# Patient Record
Sex: FEMALE | Race: WHITE | Employment: FULL TIME
[De-identification: names, ages, dates, MRNs, and addresses within clinical notes are randomized per-mention and may not be internally consistent; named-entity substitution may affect disease eponyms.]

---

## 2017-02-09 ENCOUNTER — TELEPHONE (OUTPATIENT)
Dept: OBGYN | Facility: CLINIC | Age: 47
End: 2017-02-09

## 2017-02-14 DIAGNOSIS — N63.10 BREAST MASS, RIGHT: Primary | ICD-10-CM

## 2017-02-17 ENCOUNTER — TELEPHONE (OUTPATIENT)
Dept: OBGYN | Facility: CLINIC | Age: 47
End: 2017-02-17

## 2017-02-20 ENCOUNTER — TELEPHONE (OUTPATIENT)
Dept: OBGYN | Facility: CLINIC | Age: 47
End: 2017-02-20

## 2017-02-20 DIAGNOSIS — N63.10 BREAST MASS, RIGHT: Primary | ICD-10-CM

## 2018-04-23 ENCOUNTER — HOSPITAL ENCOUNTER (OUTPATIENT)
Age: 48
Discharge: HOME | End: 2018-04-25
Payer: COMMERCIAL

## 2018-04-23 ENCOUNTER — HOSPITAL ENCOUNTER (OUTPATIENT)
Dept: GENERAL RADIOLOGY | Age: 48
Discharge: HOME | End: 2018-04-25
Payer: COMMERCIAL

## 2018-04-23 DIAGNOSIS — S16.1XXA STRAIN OF NECK MUSCLE, INITIAL ENCOUNTER: ICD-10-CM

## 2018-04-23 DIAGNOSIS — M54.2 CERVICALGIA: ICD-10-CM

## 2018-04-23 PROCEDURE — 72040 X-RAY EXAM NECK SPINE 2-3 VW: CPT

## 2021-02-09 ENCOUNTER — TELEPHONE (OUTPATIENT)
Dept: PRIMARY CARE CLINIC | Age: 51
End: 2021-02-09

## 2021-02-09 RX ORDER — TIZANIDINE 4 MG/1
32 TABLET ORAL DAILY
Qty: 240 TABLET | Refills: 1 | Status: SHIPPED | OUTPATIENT
Start: 2021-02-09 | End: 2021-03-11

## 2021-03-30 ENCOUNTER — OFFICE VISIT (OUTPATIENT)
Dept: PRIMARY CARE CLINIC | Age: 51
End: 2021-03-30
Payer: COMMERCIAL

## 2021-03-30 VITALS
DIASTOLIC BLOOD PRESSURE: 72 MMHG | HEART RATE: 80 BPM | RESPIRATION RATE: 16 BRPM | SYSTOLIC BLOOD PRESSURE: 127 MMHG | TEMPERATURE: 98.1 F | BODY MASS INDEX: 26.46 KG/M2 | HEIGHT: 68 IN | WEIGHT: 174.6 LBS

## 2021-03-30 DIAGNOSIS — E11.9 TYPE 2 DIABETES MELLITUS WITHOUT COMPLICATION, WITHOUT LONG-TERM CURRENT USE OF INSULIN (HCC): Primary | ICD-10-CM

## 2021-03-30 DIAGNOSIS — F33.0 MILD EPISODE OF RECURRENT MAJOR DEPRESSIVE DISORDER (HCC): ICD-10-CM

## 2021-03-30 LAB — HBA1C MFR BLD: 7.7 %

## 2021-03-30 PROCEDURE — 3051F HG A1C>EQUAL 7.0%<8.0%: CPT | Performed by: FAMILY MEDICINE

## 2021-03-30 PROCEDURE — 83036 HEMOGLOBIN GLYCOSYLATED A1C: CPT | Performed by: FAMILY MEDICINE

## 2021-03-30 PROCEDURE — 99213 OFFICE O/P EST LOW 20 MIN: CPT | Performed by: FAMILY MEDICINE

## 2021-03-30 RX ORDER — BUPROPION HYDROCHLORIDE 100 MG/1
100 TABLET, EXTENDED RELEASE ORAL 2 TIMES DAILY
Qty: 60 TABLET | Refills: 3 | Status: SHIPPED | OUTPATIENT
Start: 2021-03-30

## 2021-03-30 RX ORDER — TIZANIDINE 4 MG/1
4 TABLET ORAL EVERY 6 HOURS PRN
COMMUNITY
End: 2021-08-02 | Stop reason: SDUPTHER

## 2021-03-30 SDOH — ECONOMIC STABILITY: FOOD INSECURITY: WITHIN THE PAST 12 MONTHS, YOU WORRIED THAT YOUR FOOD WOULD RUN OUT BEFORE YOU GOT MONEY TO BUY MORE.: NEVER TRUE

## 2021-03-30 SDOH — ECONOMIC STABILITY: TRANSPORTATION INSECURITY
IN THE PAST 12 MONTHS, HAS LACK OF TRANSPORTATION KEPT YOU FROM MEETINGS, WORK, OR FROM GETTING THINGS NEEDED FOR DAILY LIVING?: NO

## 2021-03-30 SDOH — ECONOMIC STABILITY: TRANSPORTATION INSECURITY
IN THE PAST 12 MONTHS, HAS THE LACK OF TRANSPORTATION KEPT YOU FROM MEDICAL APPOINTMENTS OR FROM GETTING MEDICATIONS?: NO

## 2021-03-30 ASSESSMENT — ENCOUNTER SYMPTOMS
ABDOMINAL DISTENTION: 1
RESPIRATORY NEGATIVE: 1
EYES NEGATIVE: 1

## 2021-03-30 ASSESSMENT — COLUMBIA-SUICIDE SEVERITY RATING SCALE - C-SSRS: 6. HAVE YOU EVER DONE ANYTHING, STARTED TO DO ANYTHING, OR PREPARED TO DO ANYTHING TO END YOUR LIFE?: NO

## 2021-03-30 ASSESSMENT — PATIENT HEALTH QUESTIONNAIRE - PHQ9
7. TROUBLE CONCENTRATING ON THINGS, SUCH AS READING THE NEWSPAPER OR WATCHING TELEVISION: 0
SUM OF ALL RESPONSES TO PHQ9 QUESTIONS 1 & 2: 6
SUM OF ALL RESPONSES TO PHQ QUESTIONS 1-9: 6
6. FEELING BAD ABOUT YOURSELF - OR THAT YOU ARE A FAILURE OR HAVE LET YOURSELF OR YOUR FAMILY DOWN: 1
10. IF YOU CHECKED OFF ANY PROBLEMS, HOW DIFFICULT HAVE THESE PROBLEMS MADE IT FOR YOU TO DO YOUR WORK, TAKE CARE OF THINGS AT HOME, OR GET ALONG WITH OTHER PEOPLE: 2
8. MOVING OR SPEAKING SO SLOWLY THAT OTHER PEOPLE COULD HAVE NOTICED. OR THE OPPOSITE, BEING SO FIGETY OR RESTLESS THAT YOU HAVE BEEN MOVING AROUND A LOT MORE THAN USUAL: 2
5. POOR APPETITE OR OVEREATING: 1
3. TROUBLE FALLING OR STAYING ASLEEP: 3
SUM OF ALL RESPONSES TO PHQ QUESTIONS 1-9: 6
2. FEELING DOWN, DEPRESSED OR HOPELESS: 3
SUM OF ALL RESPONSES TO PHQ QUESTIONS 1-9: 13
9. THOUGHTS THAT YOU WOULD BE BETTER OFF DEAD, OR OF HURTING YOURSELF: 0

## 2021-03-30 NOTE — PROGRESS NOTES
Subjective:      Patient ID: Donovan Gold is a 53 y.o. female. Patient presents to office to discuss recent lab results and current medications. Patient states she stopped taking Cymbalta because it was not effective and was causing her to gain weight. Patient states her OBGYN office will be faxing over recent lab results. Review of Systems   Constitutional: Positive for activity change, appetite change, fatigue and unexpected weight change. HENT: Negative. Eyes: Negative. Respiratory: Negative. Cardiovascular: Negative. Gastrointestinal: Positive for abdominal distention. Patient states she has been experiencing intermittent abdominal bloating. Endocrine: Negative. Genitourinary: Negative. Musculoskeletal: Negative. Skin: Negative. Allergic/Immunologic:        Patient states she has recent allergy testing with positive results. Neurological: Negative. Hematological: Negative. Psychiatric/Behavioral: The patient is nervous/anxious. Patient states she feels fatigued and is disinterested in doing anything. Objective:   Physical Exam  Constitutional:       Appearance: Normal appearance. She is obese. HENT:      Head: Normocephalic. Mouth/Throat:      Mouth: Mucous membranes are moist.      Pharynx: Oropharynx is clear. Eyes:      Extraocular Movements: Extraocular movements intact. Conjunctiva/sclera: Conjunctivae normal.      Pupils: Pupils are equal, round, and reactive to light. Neck:      Musculoskeletal: Normal range of motion and neck supple. Cardiovascular:      Rate and Rhythm: Normal rate. Pulses: Normal pulses. Heart sounds: Normal heart sounds. Pulmonary:      Effort: Pulmonary effort is normal.      Breath sounds: Normal breath sounds. Abdominal:      General: Bowel sounds are normal.   Musculoskeletal: Normal range of motion. Neurological:      General: No focal deficit present.       Mental Status: She is alert and oriented to person, place, and time. Psychiatric:         Behavior: Behavior normal.      Comments: Patient has a flat affect but is tearful at times throughout conversation. Assessment:      1. Type 2 diabetes mellitus without complication, without long-term current use of insulin ([facility] [address]            Plan:      Mary Castro was seen today for discuss labs and other. Diagnoses and all orders for this visit:    Type 2 diabetes mellitus without complication, without long-term current use of insulin ([facility])  -     POCT glycosylated hemoglobin (Hb A1C)    Other orders  -     buPROPion (WELLBUTRIN SR) 100 MG extended release tablet; Take 1 tablet by mouth 2 times daily  -     metFORMIN (GLUCOPHAGE) 500 MG tablet;  Take 1 tablet by mouth daily (with breakfast)                Electronically signed by Dara Huitron MD on 3/30/2021 at 12:25 PM

## 2021-04-01 ENCOUNTER — TELEPHONE (OUTPATIENT)
Dept: PRIMARY CARE CLINIC | Age: 51
End: 2021-04-01

## 2021-04-01 NOTE — TELEPHONE ENCOUNTER
Pt stated that the meds she was recently prescribed for her diabetes has a chemical (polyethylene glycol)  in it that she is allergic to. She is on the diet and doing the exercise. She wants to know if she she needs to still be seen in two weeks or in a month or so.  Please advise

## 2021-05-14 ENCOUNTER — OFFICE VISIT (OUTPATIENT)
Dept: PRIMARY CARE CLINIC | Age: 51
End: 2021-05-14
Payer: COMMERCIAL

## 2021-05-14 VITALS
SYSTOLIC BLOOD PRESSURE: 130 MMHG | OXYGEN SATURATION: 98 % | BODY MASS INDEX: 24.71 KG/M2 | DIASTOLIC BLOOD PRESSURE: 84 MMHG | WEIGHT: 163 LBS | HEIGHT: 68 IN | HEART RATE: 77 BPM

## 2021-05-14 DIAGNOSIS — R73.9 HYPERGLYCEMIA: Primary | ICD-10-CM

## 2021-05-14 LAB — HBA1C MFR BLD: 7.2 %

## 2021-05-14 PROCEDURE — 99213 OFFICE O/P EST LOW 20 MIN: CPT | Performed by: FAMILY MEDICINE

## 2021-05-14 PROCEDURE — 83036 HEMOGLOBIN GLYCOSYLATED A1C: CPT | Performed by: FAMILY MEDICINE

## 2021-05-14 NOTE — PROGRESS NOTES
Subjective:      Patient ID: Luis Zapien is a 53 y.o. female. Has lost 20 lbs  Feels a lot better with lower carb diet  Is more active  Could not take metformin due to propelene glycol allergy      Review of Systems   Constitutional: Negative. All other systems reviewed and are negative. Objective:   Physical Exam  Vitals signs reviewed. Constitutional:       Appearance: Normal appearance. Cardiovascular:      Rate and Rhythm: Normal rate. Musculoskeletal: Normal range of motion. Skin:     General: Skin is warm and dry. Neurological:      General: No focal deficit present. Psychiatric:         Thought Content: Thought content normal.         Judgment: Judgment normal.         Assessment:      1. Hyperglycemia            Plan:      Patrick Menjivar was seen today for follow-up.     Diagnoses and all orders for this visit:    Hyperglycemia      A1C better by 0.5  Will stay the course and recheck in 2 mo          Electronically signed by Rei Velarde MD on 5/14/2021 at 12:46 PM

## 2021-08-02 ENCOUNTER — OFFICE VISIT (OUTPATIENT)
Dept: PRIMARY CARE CLINIC | Age: 51
End: 2021-08-02
Payer: COMMERCIAL

## 2021-08-02 VITALS
SYSTOLIC BLOOD PRESSURE: 139 MMHG | HEIGHT: 67 IN | DIASTOLIC BLOOD PRESSURE: 87 MMHG | OXYGEN SATURATION: 99 % | BODY MASS INDEX: 23.86 KG/M2 | WEIGHT: 152 LBS | HEART RATE: 73 BPM

## 2021-08-02 DIAGNOSIS — E11.9 TYPE 2 DIABETES MELLITUS WITHOUT COMPLICATION, WITHOUT LONG-TERM CURRENT USE OF INSULIN (HCC): Primary | ICD-10-CM

## 2021-08-02 LAB — HBA1C MFR BLD: 6.9 %

## 2021-08-02 PROCEDURE — 99213 OFFICE O/P EST LOW 20 MIN: CPT | Performed by: FAMILY MEDICINE

## 2021-08-02 PROCEDURE — 83036 HEMOGLOBIN GLYCOSYLATED A1C: CPT | Performed by: FAMILY MEDICINE

## 2021-08-02 RX ORDER — TIZANIDINE 4 MG/1
4 TABLET ORAL EVERY 6 HOURS PRN
Qty: 60 TABLET | Refills: 3 | Status: SHIPPED | OUTPATIENT
Start: 2021-08-02 | End: 2022-01-18 | Stop reason: SDUPTHER

## 2021-08-02 RX ORDER — TIZANIDINE 4 MG/1
4 TABLET ORAL EVERY 6 HOURS PRN
Status: CANCELLED | OUTPATIENT
Start: 2021-08-02

## 2021-08-02 ASSESSMENT — ENCOUNTER SYMPTOMS
GASTROINTESTINAL NEGATIVE: 1
RESPIRATORY NEGATIVE: 1
RHINORRHEA: 1
EYE ITCHING: 1

## 2021-08-02 NOTE — PROGRESS NOTES
Subjective:      Patient ID: Juan F Seth is a 53 y.o. female. Patient presents for follow-up on Type 2 diabetes mellitus. Patient states she has not taken Metformin due to being allergic to the ingredients. Patient is managing diabetes with diet and activity. Review of Systems   Constitutional: Negative. HENT: Positive for congestion and rhinorrhea. Eyes: Positive for itching. Respiratory: Negative. Cardiovascular: Negative. Gastrointestinal: Negative. Endocrine: Negative. Genitourinary: Negative. Musculoskeletal: Positive for myalgias. Skin: Negative. Allergic/Immunologic: Positive for environmental allergies and food allergies. Neurological: Negative. Hematological: Negative. Psychiatric/Behavioral: Negative. Objective:   Physical Exam  Vitals reviewed. Constitutional:       Appearance: Normal appearance. She is normal weight. Comments: Recent planned weight loss. HENT:      Head: Normocephalic. Mouth/Throat:      Mouth: Mucous membranes are moist.      Pharynx: Oropharynx is clear. Cardiovascular:      Rate and Rhythm: Normal rate and regular rhythm. Pulses: Normal pulses. Heart sounds: Normal heart sounds. Pulmonary:      Effort: Pulmonary effort is normal.      Breath sounds: Normal breath sounds. Abdominal:      General: Bowel sounds are normal.   Musculoskeletal:         General: Normal range of motion. Skin:     General: Skin is warm and dry. Neurological:      General: No focal deficit present. Mental Status: She is alert and oriented to person, place, and time. Psychiatric:         Mood and Affect: Mood normal.         Behavior: Behavior normal.         Assessment:      1. Type 2 diabetes mellitus without complication, without long-term current use of insulin ([address]            Plan:      Veronica Newsome was seen today for diabetes and medication refill.     Diagnoses and all orders for this visit:    Type 2 diabetes mellitus without complication, without long-term current use of insulin ([address]    Other orders  -     tiZANidine (ZANAFLEX) 4 MG tablet; Take 1 tablet by mouth every 6 hours as needed (muscle pain) 8 mg at bed time, pt states she can take up to 32 mg per day if needed. Patient has made lifestyle modifications to control type 2 diabetes. Patient has lost lost 22 lbs. and her HA1C has gradually improved since 3/30/2021. Follow-up visit in 3 months.         Electronically signed by Tre Garcia MD on 8/2/2021 at 9:36 AM

## 2021-08-03 ENCOUNTER — TELEPHONE (OUTPATIENT)
Dept: PRIMARY CARE CLINIC | Age: 51
End: 2021-08-03

## 2021-08-03 NOTE — TELEPHONE ENCOUNTER
----- Message from Austin Johnson sent at 8/3/2021  9:24 AM EDT -----  Subject: Medication Problem    QUESTIONS  Name of Medication? tiZANidine (ZANAFLEX) 4 MG tablet  Patient-reported dosage and instructions? Take 1 tablet by mouth every 6   hours as needed (muscle pain) 8 mg at bed time, pt states she can take up   to 32 mg per day if needed. What question or problem do you have with the medication? Patient said was   only prescribed 60 tablets which only covers \"at night\" does not have   enough for the daytime dose. Would like to have a 90 days supply. Preferred Pharmacy? [address] [place] [address] phone number (if available)? [phone]  Additional Information for Provider? Please give the maximum amount, a 90   day supply is more affordable. Thank you  ---------------------------------------------------------------------------  --------------  CALL BACK INFO  What is the best way for the office to contact you? OK to leave message on   voicemail  Preferred Call Back Phone Number? [phone]  ---------------------------------------------------------------------------  --------------  SCRIPT ANSWERS  Relationship to Patient?  Self

## 2022-01-19 RX ORDER — TIZANIDINE 4 MG/1
4 TABLET ORAL EVERY 6 HOURS PRN
Qty: 60 TABLET | Refills: 3 | Status: SHIPPED | OUTPATIENT
Start: 2022-01-19

## 2024-02-19 ENCOUNTER — HOSPITAL ENCOUNTER (OUTPATIENT)
Age: 54
Setting detail: SPECIMEN
Discharge: HOME | End: 2024-02-19

## 2024-02-19 LAB
BASOPHILS # BLD: 0.04 K/UL (ref 0–0.2)
BASOPHILS NFR BLD: 1 % (ref 0–2)
CHOLEST SERPL-MCNC: 231 MG/DL
CHOLESTEROL/HDL RATIO: 2.5
EOSINOPHIL # BLD: 0.48 K/UL (ref 0–0.44)
EOSINOPHILS RELATIVE PERCENT: 7 % (ref 1–4)
ERYTHROCYTE [DISTWIDTH] IN BLOOD BY AUTOMATED COUNT: 12.4 % (ref 11.8–14.4)
HCT VFR BLD AUTO: 44 % (ref 36.3–47.1)
HDLC SERPL-MCNC: 92 MG/DL
HGB BLD-MCNC: 14.5 G/DL (ref 11.9–15.1)
IMM GRANULOCYTES # BLD AUTO: <0.03 K/UL (ref 0–0.3)
IMM GRANULOCYTES NFR BLD: 0 %
LDLC SERPL CALC-MCNC: 107 MG/DL (ref 0–130)
LYMPHOCYTES NFR BLD: 2.42 K/UL (ref 1.1–3.7)
LYMPHOCYTES RELATIVE PERCENT: 34 % (ref 24–43)
MCH RBC QN AUTO: 33 PG (ref 25.2–33.5)
MCHC RBC AUTO-ENTMCNC: 33 G/DL (ref 28.4–34.8)
MCV RBC AUTO: 100.2 FL (ref 82.6–102.9)
MONOCYTES NFR BLD: 0.58 K/UL (ref 0.1–1.2)
MONOCYTES NFR BLD: 8 % (ref 3–12)
NEUTROPHILS NFR BLD: 50 % (ref 36–65)
NEUTS SEG NFR BLD: 3.56 K/UL (ref 1.5–8.1)
NRBC BLD-RTO: 0 PER 100 WBC
PLATELET # BLD AUTO: 380 K/UL (ref 138–453)
PMV BLD AUTO: 9.5 FL (ref 8.1–13.5)
RBC # BLD AUTO: 4.39 M/UL (ref 3.95–5.11)
T4 FREE SERPL-MCNC: 1.3 NG/DL (ref 0.9–1.7)
TRIGL SERPL-MCNC: 160 MG/DL
TSH SERPL DL<=0.05 MIU/L-ACNC: 1.56 UIU/ML (ref 0.3–5)
WBC OTHER # BLD: 7.1 K/UL (ref 3.5–11.3)

## 2024-07-13 ENCOUNTER — APPOINTMENT (OUTPATIENT)
Dept: CT IMAGING | Age: 54
DRG: 439 | End: 2024-07-13
Payer: COMMERCIAL

## 2024-07-13 ENCOUNTER — HOSPITAL ENCOUNTER (INPATIENT)
Age: 54
LOS: 3 days | Discharge: PSYCHIATRIC OR SUBSTANCE USE FACILITY | DRG: 439 | End: 2024-07-16
Attending: EMERGENCY MEDICINE | Admitting: STUDENT IN AN ORGANIZED HEALTH CARE EDUCATION/TRAINING PROGRAM
Payer: COMMERCIAL

## 2024-07-13 ENCOUNTER — APPOINTMENT (OUTPATIENT)
Dept: MRI IMAGING | Age: 54
DRG: 439 | End: 2024-07-13
Payer: COMMERCIAL

## 2024-07-13 ENCOUNTER — APPOINTMENT (OUTPATIENT)
Dept: GENERAL RADIOLOGY | Age: 54
DRG: 439 | End: 2024-07-13
Payer: COMMERCIAL

## 2024-07-13 DIAGNOSIS — N17.9 ACUTE KIDNEY INJURY (HCC): ICD-10-CM

## 2024-07-13 DIAGNOSIS — K85.90 ACUTE PANCREATITIS, UNSPECIFIED COMPLICATION STATUS, UNSPECIFIED PANCREATITIS TYPE: Primary | ICD-10-CM

## 2024-07-13 PROBLEM — M79.7 CHRONIC FATIGUE SYNDROME WITH FIBROMYALGIA: Status: ACTIVE | Noted: 2024-07-13

## 2024-07-13 PROBLEM — I10 PRIMARY HYPERTENSION: Status: ACTIVE | Noted: 2024-07-13

## 2024-07-13 PROBLEM — E11.9 TYPE 2 DIABETES MELLITUS WITHOUT COMPLICATION, WITHOUT LONG-TERM CURRENT USE OF INSULIN (HCC): Status: ACTIVE | Noted: 2024-07-13

## 2024-07-13 PROBLEM — G93.32 CHRONIC FATIGUE SYNDROME WITH FIBROMYALGIA: Status: ACTIVE | Noted: 2024-07-13

## 2024-07-13 LAB
ALBUMIN SERPL-MCNC: 4.4 G/DL (ref 3.5–5.2)
ALBUMIN/GLOB SERPL: 1.6 {RATIO} (ref 1–2.5)
ALP SERPL-CCNC: 71 U/L (ref 35–104)
ALT SERPL-CCNC: 16 U/L (ref 5–33)
AMPHET UR QL SCN: NEGATIVE
ANION GAP SERPL CALCULATED.3IONS-SCNC: 17 MMOL/L (ref 9–17)
AST SERPL-CCNC: 16 U/L
BACTERIA URNS QL MICRO: ABNORMAL
BARBITURATES UR QL SCN: NEGATIVE
BASOPHILS # BLD: 0.1 K/UL (ref 0–0.2)
BASOPHILS NFR BLD: 1 % (ref 0–2)
BENZODIAZ UR QL: NEGATIVE
BILIRUB SERPL-MCNC: 0.5 MG/DL (ref 0.3–1.2)
BILIRUB UR QL STRIP: NEGATIVE
BUN SERPL-MCNC: 44 MG/DL (ref 6–20)
CALCIUM SERPL-MCNC: 9.9 MG/DL (ref 8.6–10.4)
CANNABINOIDS UR QL SCN: NEGATIVE
CASTS #/AREA URNS LPF: ABNORMAL /LPF
CASTS #/AREA URNS LPF: ABNORMAL /LPF
CHARACTER UR: ABNORMAL
CHLORIDE SERPL-SCNC: 91 MMOL/L (ref 98–107)
CK SERPL-CCNC: 47 U/L (ref 26–192)
CLARITY UR: CLEAR
CO2 SERPL-SCNC: 20 MMOL/L (ref 20–31)
COCAINE UR QL SCN: NEGATIVE
COLOR UR: YELLOW
CREAT SERPL-MCNC: 3.4 MG/DL (ref 0.5–0.9)
EOSINOPHIL # BLD: 0.2 K/UL (ref 0–0.4)
EOSINOPHILS RELATIVE PERCENT: 2 % (ref 1–4)
EPI CELLS #/AREA URNS HPF: ABNORMAL /HPF (ref 0–5)
ERYTHROCYTE [DISTWIDTH] IN BLOOD BY AUTOMATED COUNT: 12 % (ref 12.5–15.4)
ETHANOL PERCENT: <0.01 %
ETHANOLAMINE SERPL-MCNC: <10 MG/DL (ref 0–0.08)
FENTANYL UR QL: NEGATIVE
GFR, ESTIMATED: 16 ML/MIN/1.73M2
GLUCOSE BLD-MCNC: 127 MG/DL (ref 65–105)
GLUCOSE BLD-MCNC: 141 MG/DL (ref 65–105)
GLUCOSE SERPL-MCNC: 161 MG/DL (ref 70–99)
GLUCOSE UR STRIP-MCNC: ABNORMAL MG/DL
HCT VFR BLD AUTO: 37.1 % (ref 36–46)
HGB BLD-MCNC: 12.6 G/DL (ref 12–16)
HGB UR QL STRIP.AUTO: ABNORMAL
KETONES UR STRIP-MCNC: NEGATIVE MG/DL
LACTATE BLDV-SCNC: 1 MMOL/L (ref 0.5–2.2)
LACTATE BLDV-SCNC: 2.3 MMOL/L (ref 0.5–2.2)
LEUKOCYTE ESTERASE UR QL STRIP: NEGATIVE
LIPASE SERPL-CCNC: 147 U/L (ref 13–60)
LYMPHOCYTES NFR BLD: 2.3 K/UL (ref 1–4.8)
LYMPHOCYTES RELATIVE PERCENT: 24 % (ref 24–44)
MCH RBC QN AUTO: 32.8 PG (ref 26–34)
MCHC RBC AUTO-ENTMCNC: 34 G/DL (ref 31–37)
MCV RBC AUTO: 96.5 FL (ref 80–100)
METHADONE UR QL: NEGATIVE
MONOCYTES NFR BLD: 0.7 K/UL (ref 0.1–1.2)
MONOCYTES NFR BLD: 8 % (ref 2–11)
MYOGLOBIN SERPL-MCNC: 72 NG/ML (ref 25–58)
NEUTROPHILS NFR BLD: 65 % (ref 36–66)
NEUTS SEG NFR BLD: 6.3 K/UL (ref 1.8–7.7)
NITRITE UR QL STRIP: NEGATIVE
OPIATES UR QL SCN: NEGATIVE
OXYCODONE UR QL SCN: NEGATIVE
PCP UR QL SCN: NEGATIVE
PH UR STRIP: 6 [PH] (ref 5–8)
PLATELET # BLD AUTO: 328 K/UL (ref 140–450)
PMV BLD AUTO: 7.3 FL (ref 6–12)
POTASSIUM SERPL-SCNC: 3.9 MMOL/L (ref 3.7–5.3)
PROT SERPL-MCNC: 7.1 G/DL (ref 6.4–8.3)
PROT UR STRIP-MCNC: NEGATIVE MG/DL
RBC # BLD AUTO: 3.85 M/UL (ref 4–5.2)
RBC #/AREA URNS HPF: ABNORMAL /HPF (ref 0–2)
SODIUM SERPL-SCNC: 128 MMOL/L (ref 135–144)
SP GR UR STRIP: 1.01 (ref 1–1.03)
TEST INFORMATION: NORMAL
UROBILINOGEN UR STRIP-ACNC: NORMAL EU/DL (ref 0–1)
WBC #/AREA URNS HPF: ABNORMAL /HPF (ref 0–5)
WBC OTHER # BLD: 9.6 K/UL (ref 3.5–11)

## 2024-07-13 PROCEDURE — 2580000003 HC RX 258: Performed by: EMERGENCY MEDICINE

## 2024-07-13 PROCEDURE — 71045 X-RAY EXAM CHEST 1 VIEW: CPT

## 2024-07-13 PROCEDURE — 2580000003 HC RX 258: Performed by: STUDENT IN AN ORGANIZED HEALTH CARE EDUCATION/TRAINING PROGRAM

## 2024-07-13 PROCEDURE — 82947 ASSAY GLUCOSE BLOOD QUANT: CPT

## 2024-07-13 PROCEDURE — 6360000002 HC RX W HCPCS: Performed by: STUDENT IN AN ORGANIZED HEALTH CARE EDUCATION/TRAINING PROGRAM

## 2024-07-13 PROCEDURE — 83690 ASSAY OF LIPASE: CPT

## 2024-07-13 PROCEDURE — 71250 CT THORAX DX C-: CPT

## 2024-07-13 PROCEDURE — 1210000000 HC MED SURG R&B

## 2024-07-13 PROCEDURE — 6360000002 HC RX W HCPCS: Performed by: EMERGENCY MEDICINE

## 2024-07-13 PROCEDURE — 96361 HYDRATE IV INFUSION ADD-ON: CPT

## 2024-07-13 PROCEDURE — 83605 ASSAY OF LACTIC ACID: CPT

## 2024-07-13 PROCEDURE — 80307 DRUG TEST PRSMV CHEM ANLYZR: CPT

## 2024-07-13 PROCEDURE — 74181 MRI ABDOMEN W/O CONTRAST: CPT

## 2024-07-13 PROCEDURE — 93005 ELECTROCARDIOGRAM TRACING: CPT | Performed by: STUDENT IN AN ORGANIZED HEALTH CARE EDUCATION/TRAINING PROGRAM

## 2024-07-13 PROCEDURE — 83874 ASSAY OF MYOGLOBIN: CPT

## 2024-07-13 PROCEDURE — 85025 COMPLETE CBC W/AUTO DIFF WBC: CPT

## 2024-07-13 PROCEDURE — G0480 DRUG TEST DEF 1-7 CLASSES: HCPCS

## 2024-07-13 PROCEDURE — 6370000000 HC RX 637 (ALT 250 FOR IP): Performed by: NURSE PRACTITIONER

## 2024-07-13 PROCEDURE — 80053 COMPREHEN METABOLIC PANEL: CPT

## 2024-07-13 PROCEDURE — 96374 THER/PROPH/DIAG INJ IV PUSH: CPT

## 2024-07-13 PROCEDURE — 99222 1ST HOSP IP/OBS MODERATE 55: CPT | Performed by: STUDENT IN AN ORGANIZED HEALTH CARE EDUCATION/TRAINING PROGRAM

## 2024-07-13 PROCEDURE — 99285 EMERGENCY DEPT VISIT HI MDM: CPT

## 2024-07-13 PROCEDURE — 82550 ASSAY OF CK (CPK): CPT

## 2024-07-13 PROCEDURE — 36415 COLL VENOUS BLD VENIPUNCTURE: CPT

## 2024-07-13 PROCEDURE — 74176 CT ABD & PELVIS W/O CONTRAST: CPT

## 2024-07-13 PROCEDURE — 81001 URINALYSIS AUTO W/SCOPE: CPT

## 2024-07-13 RX ORDER — ACETAMINOPHEN 650 MG/1
650 SUPPOSITORY RECTAL EVERY 6 HOURS PRN
Status: DISCONTINUED | OUTPATIENT
Start: 2024-07-13 | End: 2024-07-16 | Stop reason: HOSPADM

## 2024-07-13 RX ORDER — ENOXAPARIN SODIUM 100 MG/ML
30 INJECTION SUBCUTANEOUS DAILY
Status: CANCELLED | OUTPATIENT
Start: 2024-07-13

## 2024-07-13 RX ORDER — HEPARIN SODIUM 5000 [USP'U]/ML
5000 INJECTION, SOLUTION INTRAVENOUS; SUBCUTANEOUS EVERY 8 HOURS SCHEDULED
Status: DISCONTINUED | OUTPATIENT
Start: 2024-07-13 | End: 2024-07-16 | Stop reason: HOSPADM

## 2024-07-13 RX ORDER — 0.9 % SODIUM CHLORIDE 0.9 %
1000 INTRAVENOUS SOLUTION INTRAVENOUS ONCE
Status: COMPLETED | OUTPATIENT
Start: 2024-07-13 | End: 2024-07-13

## 2024-07-13 RX ORDER — ONDANSETRON 2 MG/ML
4 INJECTION INTRAMUSCULAR; INTRAVENOUS ONCE
Status: COMPLETED | OUTPATIENT
Start: 2024-07-13 | End: 2024-07-13

## 2024-07-13 RX ORDER — GLUCAGON 1 MG/ML
1 KIT INJECTION PRN
Status: DISCONTINUED | OUTPATIENT
Start: 2024-07-13 | End: 2024-07-16 | Stop reason: HOSPADM

## 2024-07-13 RX ORDER — SODIUM CHLORIDE 9 MG/ML
INJECTION, SOLUTION INTRAVENOUS PRN
Status: DISCONTINUED | OUTPATIENT
Start: 2024-07-13 | End: 2024-07-16 | Stop reason: HOSPADM

## 2024-07-13 RX ORDER — SODIUM CHLORIDE 9 MG/ML
INJECTION, SOLUTION INTRAVENOUS CONTINUOUS
Status: ACTIVE | OUTPATIENT
Start: 2024-07-13 | End: 2024-07-15

## 2024-07-13 RX ORDER — ONDANSETRON 4 MG/1
4 TABLET, ORALLY DISINTEGRATING ORAL EVERY 8 HOURS PRN
Status: DISCONTINUED | OUTPATIENT
Start: 2024-07-13 | End: 2024-07-16 | Stop reason: HOSPADM

## 2024-07-13 RX ORDER — SODIUM CHLORIDE 0.9 % (FLUSH) 0.9 %
5-40 SYRINGE (ML) INJECTION EVERY 12 HOURS SCHEDULED
Status: DISCONTINUED | OUTPATIENT
Start: 2024-07-13 | End: 2024-07-16 | Stop reason: HOSPADM

## 2024-07-13 RX ORDER — SODIUM CHLORIDE 0.9 % (FLUSH) 0.9 %
3 SYRINGE (ML) INJECTION EVERY 8 HOURS
Status: DISCONTINUED | OUTPATIENT
Start: 2024-07-13 | End: 2024-07-16 | Stop reason: HOSPADM

## 2024-07-13 RX ORDER — INSULIN LISPRO 100 [IU]/ML
0-4 INJECTION, SOLUTION INTRAVENOUS; SUBCUTANEOUS
Status: DISCONTINUED | OUTPATIENT
Start: 2024-07-13 | End: 2024-07-16 | Stop reason: HOSPADM

## 2024-07-13 RX ORDER — ACETAMINOPHEN 325 MG/1
650 TABLET ORAL EVERY 6 HOURS PRN
Status: DISCONTINUED | OUTPATIENT
Start: 2024-07-13 | End: 2024-07-16 | Stop reason: HOSPADM

## 2024-07-13 RX ORDER — SODIUM CHLORIDE 0.9 % (FLUSH) 0.9 %
5-40 SYRINGE (ML) INJECTION PRN
Status: DISCONTINUED | OUTPATIENT
Start: 2024-07-13 | End: 2024-07-16 | Stop reason: HOSPADM

## 2024-07-13 RX ORDER — ARIPIPRAZOLE 5 MG/1
10 TABLET ORAL DAILY
Status: DISCONTINUED | OUTPATIENT
Start: 2024-07-14 | End: 2024-07-16 | Stop reason: HOSPADM

## 2024-07-13 RX ORDER — DULOXETIN HYDROCHLORIDE 30 MG/1
30 CAPSULE, DELAYED RELEASE ORAL DAILY
Status: DISCONTINUED | OUTPATIENT
Start: 2024-07-14 | End: 2024-07-16 | Stop reason: HOSPADM

## 2024-07-13 RX ORDER — ONDANSETRON 2 MG/ML
4 INJECTION INTRAMUSCULAR; INTRAVENOUS EVERY 6 HOURS PRN
Status: DISCONTINUED | OUTPATIENT
Start: 2024-07-13 | End: 2024-07-16 | Stop reason: HOSPADM

## 2024-07-13 RX ORDER — DEXTROSE MONOHYDRATE 100 MG/ML
INJECTION, SOLUTION INTRAVENOUS CONTINUOUS PRN
Status: DISCONTINUED | OUTPATIENT
Start: 2024-07-13 | End: 2024-07-16 | Stop reason: HOSPADM

## 2024-07-13 RX ORDER — LISINOPRIL 2.5 MG/1
20 TABLET ORAL DAILY
Status: ON HOLD | COMMUNITY
End: 2024-07-17 | Stop reason: HOSPADM

## 2024-07-13 RX ORDER — INSULIN LISPRO 100 [IU]/ML
0-4 INJECTION, SOLUTION INTRAVENOUS; SUBCUTANEOUS NIGHTLY
Status: DISCONTINUED | OUTPATIENT
Start: 2024-07-13 | End: 2024-07-16 | Stop reason: HOSPADM

## 2024-07-13 RX ORDER — BISACODYL 5 MG/1
5 TABLET, DELAYED RELEASE ORAL DAILY PRN
Status: DISCONTINUED | OUTPATIENT
Start: 2024-07-13 | End: 2024-07-16 | Stop reason: HOSPADM

## 2024-07-13 RX ORDER — TIZANIDINE 4 MG/1
4 TABLET ORAL EVERY 6 HOURS PRN
Status: DISCONTINUED | OUTPATIENT
Start: 2024-07-13 | End: 2024-07-14

## 2024-07-13 RX ORDER — HYDROCHLOROTHIAZIDE 12.5 MG/1
25 TABLET ORAL DAILY
Status: ON HOLD | COMMUNITY
End: 2024-07-17 | Stop reason: HOSPADM

## 2024-07-13 RX ADMIN — HEPARIN SODIUM 5000 UNITS: 5000 INJECTION INTRAVENOUS; SUBCUTANEOUS at 17:24

## 2024-07-13 RX ADMIN — SODIUM CHLORIDE, PRESERVATIVE FREE 3 ML: 5 INJECTION INTRAVENOUS at 08:43

## 2024-07-13 RX ADMIN — TIZANIDINE 4 MG: 4 TABLET ORAL at 22:11

## 2024-07-13 RX ADMIN — SODIUM CHLORIDE, PRESERVATIVE FREE 10 ML: 5 INJECTION INTRAVENOUS at 22:13

## 2024-07-13 RX ADMIN — SODIUM CHLORIDE 1000 ML: 9 INJECTION, SOLUTION INTRAVENOUS at 08:38

## 2024-07-13 RX ADMIN — ONDANSETRON 4 MG: 2 INJECTION INTRAMUSCULAR; INTRAVENOUS at 08:40

## 2024-07-13 RX ADMIN — SODIUM CHLORIDE 1000 ML: 9 INJECTION, SOLUTION INTRAVENOUS at 11:03

## 2024-07-13 RX ADMIN — HEPARIN SODIUM 5000 UNITS: 5000 INJECTION INTRAVENOUS; SUBCUTANEOUS at 22:11

## 2024-07-13 RX ADMIN — SODIUM CHLORIDE: 9 INJECTION, SOLUTION INTRAVENOUS at 17:24

## 2024-07-13 ASSESSMENT — PAIN DESCRIPTION - ONSET: ONSET: ON-GOING

## 2024-07-13 ASSESSMENT — PAIN DESCRIPTION - DESCRIPTORS: DESCRIPTORS: OTHER (COMMENT)

## 2024-07-13 ASSESSMENT — PAIN DESCRIPTION - PAIN TYPE: TYPE: CHRONIC PAIN

## 2024-07-13 ASSESSMENT — PAIN SCALES - GENERAL: PAINLEVEL_OUTOF10: 10

## 2024-07-13 ASSESSMENT — LIFESTYLE VARIABLES: HOW OFTEN DO YOU HAVE A DRINK CONTAINING ALCOHOL: 2-4 TIMES A MONTH

## 2024-07-13 ASSESSMENT — PAIN DESCRIPTION - LOCATION: LOCATION: CHEST

## 2024-07-13 ASSESSMENT — PAIN DESCRIPTION - FREQUENCY: FREQUENCY: INTERMITTENT

## 2024-07-13 NOTE — ED NOTES
Ambulatory to restroom w/ side assist; states sl dizzy/weak but gait slow/steady. Voiding clear yellow urine for specimen; asking for brief to put on/given.

## 2024-07-13 NOTE — ED NOTES
Ambulatory to restroom; states feeling \"even more dizzy\" but awake/alert  gait slow but steady w/ side assist. Void and return to bed w/ call light w/in reach. IV fluids continue via pump

## 2024-07-13 NOTE — H&P
[facility]  Office: [phone]  Magdaleno Ace DO, Arnoldo Smith DO, Junito Hardin DO, Oliiver Dsouza DO, Rodrigo Phillips MD, Andreia Bettencourt MD, Niya Solorio MD, Lucia Loyd MD,  Taurus Landa MD, Josef Leonard MD, Nani Guillen MD,  Anthony Bull DO, Roger Cuellar MD, Dagoberto Aguilar MD, Alphonso Ace DO, Radha Webb MD,  Abel Arnold DO, Mireya Kilpatrick MD, Merari Valencia MD, Key Payan MD, Sharmin Roland MD,  Gil Alvarado MD, Delaney Bennett MD, Kendra Roberts MD, Paz Breaux MD, Audie Santos MD, Toni Edmondson MD, Eddie Mcqueen DO, Aaron Nunez DO, Irwin Boo MD,  George Fournier MD, Shirley Waterhouse, CNP,  Emi Young, CNP, Ronnie Martinez, CNP,  Christina Link, DNP, Tammie Spivey, CNP, Deanne Connolly, CNP, Swetha Gagnon, CNP, Alice Moreno, CNP, Jaquelin Sutton, PA-C, Rosalinda Escobar PA-C, Jolene Garber, CNP, Rose Perez, CNP, Scott Guzman, CNP, Cami Murillo, CNP, Meagan Marquis, CNP, Susan Ledezma, CNS, Princess Wagner, CNP, Alona Roach, CNP, Tracy Schwab, CNP         [facility]   IN-PATIENT SERVICE   [facility]    HISTORY AND PHYSICAL EXAMINATION            Date:   7/13/2024  Patient name:  Brooke Blake  Date of admission:  7/13/2024  8:21 AM  MRN:   [id number]  Account:  [id number]  YOB: 1970  PCP:    Albert Julio MD  Room:   1TRAUMA/1TRAUMA  Code Status:    No Order      History Obtained From:     patient    History of Present Illness:     Brooke Blake is a 53 y.o. female with a past medical history of chronic fatigue syndrome, fibromyalgia, DM2 and HTN who presented to the emergency department on 7/13/2024 complaining of nausea/vomiting and failure to thrive. The patient states that her symptoms began over a month ago and have progressively worsened. She states that she has very little to eat over the past month and this is secondary to abdominal pain as well as loss of  <0.010 <0.010 %   CK    Collection Time: 07/13/24  8:20 AM   Result Value Ref Range    Total CK 47 26 - 192 U/L   Myoglobin, Blood    Collection Time: 07/13/24  8:20 AM   Result Value Ref Range    Myoglobin 72 (H) 25 - 58 ng/mL   Urinalysis with Reflex to Culture    Collection Time: 07/13/24  8:59 AM    Specimen: Urine   Result Value Ref Range    Color, UA Yellow Yellow    Turbidity UA Clear Clear    Glucose, Ur 2+ (A) NEGATIVE mg/dL    Bilirubin, Urine NEGATIVE NEGATIVE    Ketones, Urine NEGATIVE NEGATIVE mg/dL    Specific Gravity, UA 1.010 1.005 - 1.030    Urine Hgb TRACE (A) NEGATIVE    pH, Urine 6.0 5.0 - 8.0    Protein, UA NEGATIVE NEGATIVE mg/dL    Urobilinogen, Urine Normal 0.0 - 1.0 EU/dL    Nitrite, Urine NEGATIVE NEGATIVE    Leukocyte Esterase, Urine NEGATIVE NEGATIVE   Microscopic Urinalysis    Collection Time: 07/13/24  8:59 AM   Result Value Ref Range    WBC, UA 2 TO 5 0 - 5 /HPF    RBC, UA 0 TO 2 0 - 2 /HPF    Casts UA 0 TO 2 /LPF    Casts UA HYALINE /LPF    Epithelial Cells, UA 0 TO 2 0 - 5 /HPF    Bacteria, UA FEW (A) None    Other Observations UA (A) NOT REQ.     Utilizing a urinalysis as the only screening method to exclude a potential uropathogen can be unreliable in many patient populations.  Rapid screening tests are less sensitive than culture and if UTI is a clinical possibility, culture should be considered despite a negative urinalysis.     DRUG SCREEN MULTI URINE    Collection Time: 07/13/24  8:59 AM   Result Value Ref Range    Amphetamine Screen, Ur NEGATIVE NEGATIVE    Barbiturate Screen, Ur NEGATIVE NEGATIVE    Benzodiazepine Screen, Urine NEGATIVE NEGATIVE    Cocaine Metabolite, Urine NEGATIVE NEGATIVE    Methadone Screen, Urine NEGATIVE NEGATIVE    Opiates, Urine NEGATIVE NEGATIVE    Phencyclidine, Urine NEGATIVE NEGATIVE    Cannabinoid Scrn, Ur NEGATIVE NEGATIVE    Oxycodone Screen, Ur NEGATIVE NEGATIVE    Fentanyl, Ur NEGATIVE NEGATIVE    Test Information       [facility] provides medical

## 2024-07-13 NOTE — PROGRESS NOTES
During admission assessment, pt was tearful, anxious, and has great fear of financial struggle due to recent stressful events. Pt has had increased weight loss, fatigue an inability to drive. Pt has not left her home in some time due to stressors. Pt appears to be in emotional distress and not coping well. Social Service consult made along with Spiritual services. Dr. Aguilar also updated

## 2024-07-13 NOTE — PLAN OF CARE
Problem: Discharge Planning  Goal: Discharge to home or other facility with appropriate resources  Outcome: Progressing  Flowsheets (Taken 7/13/2024 1501)  Discharge to home or other facility with appropriate resources: Identify barriers to discharge with patient and caregiver     Problem: Pain  Goal: Verbalizes/displays adequate comfort level or baseline comfort level  Outcome: Progressing     Problem: Safety - Adult  Goal: Free from fall injury  Outcome: Progressing     Problem: Self Harm/Suicidality  Goal: Will have no self-injury during hospital stay  Description: INTERVENTIONS:  1.  Ensure constant observer at bedside with Q15M safety checks  2.  Maintain a safe environment  3.  Secure patient belongings  4.  Ensure family/visitors adhere to safety recommendations  5.  Ensure safety tray has been added to patient's diet order  6.  Every shift and PRN: Re-assess suicidal risk via Frequent Screener    Outcome: Progressing

## 2024-07-13 NOTE — ED PROVIDER NOTES
[facility] Emergency Department  [address]  [place] [facility] [address]  Phone: [phone]  Fax: [phone]  EMERGENCY DEPARTMENT ENCOUNTER      Pt Name: Brooke Blake  MRN: [id number]  Birthdate 1970  Date of evaluation: 7/13/2024    CHIEF COMPLAINT       Chief Complaint   Patient presents with    Fatigue     Fatigue/weakness since end of June; been \"home bound\" per pt since then; states supposed to have blood work by PCP but unable to get it done until home health anshu blood yesterday. Today too fatigued to walk when taking out dog. Loss of weight/unable to eat. Hx fibromyalgia and chronic fatigue.       HISTORY OF PRESENT ILLNESS    Brooke Blake is a 53 y.o. female who presents with a complaint of fatigue.  Patient has a history of fibromyalgia and chronic fatigue syndrome.  She is homebound for the last 2 months.  She says she has lost 20 pounds.  However she states that she does order food from [employer] and she does not cook.  This morning she felt dizzy and faint so she called EMS.  She states every morning she seems to vomit while like emesis but this has been ongoing for the last 2 months.  She denies any abdominal pain, diarrhea, hematemesis, melena, hematochezia.  She denies any fever, chills, or cough.  She denies any URI symptoms.  She denies any chest pain, shortness of breath.  She denies any headache.  She denies any paresthesias or focal weakness.  She denies any flank pain, hematuria, dysuria.  She denies any fall or trauma.    REVIEW OF SYSTEMS     Review of Systems   All other systems reviewed and are negative.    PAST MEDICAL HISTORY    has a past medical history of Anxiety, Chronic fatigue, Fibromyalgia, Heavy periods, History of ovarian cyst, and HTN (hypertension).    SURGICAL HISTORY      has a past surgical history that includes Breast biopsy (Right, 02/14/2017).    CURRENT MEDICATIONS       Previous Medications    BUPROPION (WELLBUTRIN SR)

## 2024-07-13 NOTE — ED NOTES
Resting quietly- has eye mask covering eyes; taking sips of water per admit order; previously NPO.

## 2024-07-14 PROBLEM — F32.2 MAJOR DEPRESSIVE DISORDER, SINGLE EPISODE, SEVERE WITHOUT PSYCHOSIS (HCC): Status: ACTIVE | Noted: 2024-07-14

## 2024-07-14 LAB
ALBUMIN SERPL-MCNC: 3.8 G/DL (ref 3.5–5.2)
ALBUMIN/GLOB SERPL: 1.6 {RATIO} (ref 1–2.5)
ALP SERPL-CCNC: 60 U/L (ref 35–104)
ALT SERPL-CCNC: 14 U/L (ref 5–33)
ANION GAP SERPL CALCULATED.3IONS-SCNC: 11 MMOL/L (ref 9–17)
AST SERPL-CCNC: 15 U/L
BASOPHILS # BLD: 0.1 K/UL (ref 0–0.2)
BASOPHILS NFR BLD: 1 % (ref 0–2)
BILIRUB SERPL-MCNC: 0.4 MG/DL (ref 0.3–1.2)
BUN SERPL-MCNC: 26 MG/DL (ref 6–20)
CALCIUM SERPL-MCNC: 9.2 MG/DL (ref 8.6–10.4)
CHLORIDE SERPL-SCNC: 108 MMOL/L (ref 98–107)
CO2 SERPL-SCNC: 21 MMOL/L (ref 20–31)
CREAT SERPL-MCNC: 2 MG/DL (ref 0.5–0.9)
EKG ATRIAL RATE: 86 BPM
EKG P AXIS: 70 DEGREES
EKG P-R INTERVAL: 170 MS
EKG Q-T INTERVAL: 426 MS
EKG QRS DURATION: 78 MS
EKG QTC CALCULATION (BAZETT): 509 MS
EKG R AXIS: 82 DEGREES
EKG T AXIS: 80 DEGREES
EKG VENTRICULAR RATE: 86 BPM
EOSINOPHIL # BLD: 0.2 K/UL (ref 0–0.4)
EOSINOPHILS RELATIVE PERCENT: 3 % (ref 1–4)
ERYTHROCYTE [DISTWIDTH] IN BLOOD BY AUTOMATED COUNT: 12.5 % (ref 12.5–15.4)
GFR, ESTIMATED: 30 ML/MIN/1.73M2
GGT SERPL-CCNC: 29 U/L (ref 5–36)
GLUCOSE BLD-MCNC: 123 MG/DL (ref 65–105)
GLUCOSE BLD-MCNC: 140 MG/DL (ref 65–105)
GLUCOSE BLD-MCNC: 144 MG/DL (ref 65–105)
GLUCOSE BLD-MCNC: 191 MG/DL (ref 65–105)
GLUCOSE SERPL-MCNC: 120 MG/DL (ref 70–99)
HCT VFR BLD AUTO: 34 % (ref 36–46)
HGB BLD-MCNC: 11.7 G/DL (ref 12–16)
LIPASE SERPL-CCNC: 98 U/L (ref 13–60)
LYMPHOCYTES NFR BLD: 2.3 K/UL (ref 1–4.8)
LYMPHOCYTES RELATIVE PERCENT: 38 % (ref 24–44)
MCH RBC QN AUTO: 33.6 PG (ref 26–34)
MCHC RBC AUTO-ENTMCNC: 34.3 G/DL (ref 31–37)
MCV RBC AUTO: 98.1 FL (ref 80–100)
MONOCYTES NFR BLD: 0.5 K/UL (ref 0.1–1.2)
MONOCYTES NFR BLD: 9 % (ref 2–11)
NEUTROPHILS NFR BLD: 49 % (ref 36–66)
NEUTS SEG NFR BLD: 3 K/UL (ref 1.8–7.7)
PLATELET # BLD AUTO: 291 K/UL (ref 140–450)
PMV BLD AUTO: 7 FL (ref 6–12)
POTASSIUM SERPL-SCNC: 4 MMOL/L (ref 3.7–5.3)
PROT SERPL-MCNC: 6.2 G/DL (ref 6.4–8.3)
RBC # BLD AUTO: 3.47 M/UL (ref 4–5.2)
SODIUM SERPL-SCNC: 140 MMOL/L (ref 135–144)
WBC OTHER # BLD: 6 K/UL (ref 3.5–11)

## 2024-07-14 PROCEDURE — 83690 ASSAY OF LIPASE: CPT

## 2024-07-14 PROCEDURE — 6360000002 HC RX W HCPCS: Performed by: STUDENT IN AN ORGANIZED HEALTH CARE EDUCATION/TRAINING PROGRAM

## 2024-07-14 PROCEDURE — 1210000000 HC MED SURG R&B

## 2024-07-14 PROCEDURE — 6370000000 HC RX 637 (ALT 250 FOR IP): Performed by: STUDENT IN AN ORGANIZED HEALTH CARE EDUCATION/TRAINING PROGRAM

## 2024-07-14 PROCEDURE — 36415 COLL VENOUS BLD VENIPUNCTURE: CPT

## 2024-07-14 PROCEDURE — 2580000003 HC RX 258: Performed by: STUDENT IN AN ORGANIZED HEALTH CARE EDUCATION/TRAINING PROGRAM

## 2024-07-14 PROCEDURE — 82977 ASSAY OF GGT: CPT

## 2024-07-14 PROCEDURE — 80053 COMPREHEN METABOLIC PANEL: CPT

## 2024-07-14 PROCEDURE — 90792 PSYCH DIAG EVAL W/MED SRVCS: CPT | Performed by: PSYCHIATRY & NEUROLOGY

## 2024-07-14 PROCEDURE — 99232 SBSQ HOSP IP/OBS MODERATE 35: CPT | Performed by: STUDENT IN AN ORGANIZED HEALTH CARE EDUCATION/TRAINING PROGRAM

## 2024-07-14 PROCEDURE — 82947 ASSAY GLUCOSE BLOOD QUANT: CPT

## 2024-07-14 PROCEDURE — 97116 GAIT TRAINING THERAPY: CPT

## 2024-07-14 PROCEDURE — 85025 COMPLETE CBC W/AUTO DIFF WBC: CPT

## 2024-07-14 PROCEDURE — 97162 PT EVAL MOD COMPLEX 30 MIN: CPT

## 2024-07-14 RX ORDER — TIZANIDINE 4 MG/1
8 TABLET ORAL NIGHTLY
Status: DISCONTINUED | OUTPATIENT
Start: 2024-07-14 | End: 2024-07-16 | Stop reason: HOSPADM

## 2024-07-14 RX ADMIN — TIZANIDINE 8 MG: 4 TABLET ORAL at 22:10

## 2024-07-14 RX ADMIN — DULOXETINE HYDROCHLORIDE 30 MG: 30 CAPSULE, DELAYED RELEASE ORAL at 09:02

## 2024-07-14 RX ADMIN — HEPARIN SODIUM 5000 UNITS: 5000 INJECTION INTRAVENOUS; SUBCUTANEOUS at 06:36

## 2024-07-14 RX ADMIN — HEPARIN SODIUM 5000 UNITS: 5000 INJECTION INTRAVENOUS; SUBCUTANEOUS at 22:11

## 2024-07-14 RX ADMIN — ARIPIPRAZOLE 10 MG: 5 TABLET ORAL at 09:02

## 2024-07-14 RX ADMIN — HEPARIN SODIUM 5000 UNITS: 5000 INJECTION INTRAVENOUS; SUBCUTANEOUS at 15:11

## 2024-07-14 RX ADMIN — SODIUM CHLORIDE, PRESERVATIVE FREE 10 ML: 5 INJECTION INTRAVENOUS at 22:16

## 2024-07-14 RX ADMIN — SODIUM CHLORIDE: 9 INJECTION, SOLUTION INTRAVENOUS at 03:22

## 2024-07-14 ASSESSMENT — PAIN SCALES - GENERAL: PAINLEVEL_OUTOF10: 6

## 2024-07-14 ASSESSMENT — PAIN - FUNCTIONAL ASSESSMENT: PAIN_FUNCTIONAL_ASSESSMENT: ACTIVITIES ARE NOT PREVENTED

## 2024-07-14 ASSESSMENT — PAIN DESCRIPTION - ORIENTATION: ORIENTATION: MID;UPPER

## 2024-07-14 ASSESSMENT — PAIN DESCRIPTION - PAIN TYPE: TYPE: CHRONIC PAIN

## 2024-07-14 ASSESSMENT — PAIN DESCRIPTION - LOCATION: LOCATION: CHEST

## 2024-07-14 ASSESSMENT — PAIN DESCRIPTION - DESCRIPTORS: DESCRIPTORS: OTHER (COMMENT)

## 2024-07-14 ASSESSMENT — PAIN DESCRIPTION - FREQUENCY: FREQUENCY: INTERMITTENT

## 2024-07-14 ASSESSMENT — PAIN DESCRIPTION - ONSET: ONSET: ON-GOING

## 2024-07-14 NOTE — PLAN OF CARE
Problem: Discharge Planning  Goal: Discharge to home or other facility with appropriate resources  Outcome: Progressing  Flowsheets  Taken 7/14/2024 1805 by Unique Walters RN  Discharge to home or other facility with appropriate resources:   Identify barriers to discharge with patient and caregiver   Arrange for needed discharge resources and transportation as appropriate   Refer to discharge planning if patient needs post-hospital services based on physician order or complex needs related to functional status, cognitive ability or social support system  Taken 7/14/2024 0800 by Luna Hernandez RN  Discharge to home or other facility with appropriate resources: Identify barriers to discharge with patient and caregiver  Note: Patient being seen by psychiatry to plan for discharge needs and possible placement     Problem: Pain  Goal: Verbalizes/displays adequate comfort level or baseline comfort level  Outcome: Progressing  Flowsheets (Taken 7/14/2024 1805)  Verbalizes/displays adequate comfort level or baseline comfort level:   Encourage patient to monitor pain and request assistance   Assess pain using appropriate pain scale  Note: Patient reports no pain at this time     Problem: Safety - Adult  Goal: Free from fall injury  Outcome: Progressing  Flowsheets (Taken 7/14/2024 1805)  Free From Fall Injury: Instruct family/caregiver on patient safety  Note: Patient remains free from injury, uses call light, sitter at bedside continuous, close to nurses station, bed is low and locked     Problem: Skin/Tissue Integrity  Goal: Absence of new skin breakdown  Description: 1.  Monitor for areas of redness and/or skin breakdown  2.  Assess vascular access sites hourly  3.  Every 4-6 hours minimum:  Change oxygen saturation probe site  4.  Every 4-6 hours:  If on nasal continuous positive airway pressure, respiratory therapy assess nares and determine need for appliance change or resting period.  Outcome:

## 2024-07-14 NOTE — PROGRESS NOTES
[facility]  Office: [phone]  Magdaleno Ace DO, Arnoldo Smith DO, Junito Hardin DO, Olivier Dsouza DO, Rodrigo Phillips MD, Andreia Bettencourt MD, Niya Solorio MD, Lucia Loyd MD,  Taurus Landa MD, Josef Leonard MD, Nani Guillen MD,  Anthony Bull DO, Roger Cuellar MD, Dagoberto Aguilar MD, Alphonso Ace DO, Radha Webb MD,  Abel Arnold DO, Mireya Kilpatrick MD, Merari Valencia MD, Key Payan MD, Sharmin Roland MD,  Gil Alvarado MD, Delaney Bennett MD, Kendra Roberts MD, Paz Breaux MD, Audie Santos MD, Toni Edmondson MD, Eddie Mcqueen DO, Aaron Nunez DO, Irwin Boo MD,  George Fournier MD, Shirley Waterhouse, CNP,  Emi Young CNP, Ronnie Martinez, CNP,  Christina Link, JOSE, Tammie Spivey, CNP, Deanne Connolly, CNP, Swetha Gagnon CNP, Alice Moreno, CNP, Jaquelin Sutton, PA-C, Rosalinda Escobar PA-C, Jolene Garber, CNP, Rose Perez, CNP, Scott Guzman, CNP, Cami Murillo, CNP, Meagan Marquis, CNP, Susan Ledezma, CNS, Princess Wagner, CNP, Alona Roach CNP, Tracy Schwab, CNP         [facility]   IN-PATIENT SERVICE   [facility]    Progress Note    7/14/2024    8:59 AM    Name:   Brooke Blake  MRN:     [id number]     Acct:      [id number]   Room:   [facility] Day:  1  Admit Date:  7/13/2024  8:21 AM    PCP:   Albert Julio MD  Code Status:  Full Code    Subjective:     Patient was seen and examined at bedside this AM. She reports feeling much better overall and states that her abdominal pain is improving. Creatinine is trending down. Plan to continue supportive management with anticipated discharge in 1-2 days.     Medications:     Allergies:    Allergies   Allergen Reactions    Methylisothiazolinone     Misc Natural Products      Ethyl cynanoacrylate  sesquiterpenelactone mix  methylisothiazolinone  dimethylanunoproplamine  Propylene glycol  diaphenylguanidine    Nickel     Palm Oil     Polyethylene Glycol     Neosporin

## 2024-07-14 NOTE — CONSULTS
Department of Psychiatry   Psychiatric Assessment      Thank you very much for allowing us to participate in the care of this patient.      Reason for Consult: Depression and suicidal ideation    HISTORY OF PRESENT ILLNESS:          The patient is a 53 y.o. female with significant history of chronic fatigue syndrome, fibromyalgia, diabetes mellitus type 2, hypertension who is admitted medically secondary to abdominal distress and failure to thrive as well as hyponatremia and FIONA    Patient was consulted secondary to depressive symptoms.    Patient reports that she has been depressed for years but things have gotten substantially worse and she filed for divorce in October 2023.  She states she lives alone, she works alone, she reports that her father passed away 6 years ago and that her mother passed away 1 year ago.  She reports being estranged from her mother at the time of passing as well as her brother.  She reports that she found out about her mother's death over the Internet and nobody called her or told her.  She also reports estrangement from her daughter.  She gives a little rationale for the estrangement from all of her family members.  She denies auditory visual hallucinations.  No delusions were elicited.    Patient does report as symptoms of her depression feeling low, down and depressed nearly all day every day for months on end.  She reports poor appetite, low energy, prominent anhedonia, feelings of hopelessness helplessness worthlessness and guilt, poor concentration, poor sleep.  She is also endorsing suicidal ideations.  She reports her suicidal ideations have progressed from passive in nature to active and thinking about it.  Specifically she states just a few days ago she was thinking of carbon monoxide poisoning, locking both her and her pets in the car together.  This is a change because previously her pets were a protective factor against acting on suicide and now she is thinking she will just

## 2024-07-14 NOTE — PROGRESS NOTES
Physical Therapy  Facility/Department: [address]  Physical Therapy Initial Assessment    Name: Brooke Blake  : 1970  MRN: [id number]  Date of Service: 2024  Chief Complaint   Patient presents with    Fatigue     Fatigue/weakness since end of ; been \"home bound\" per pt since then; states supposed to have blood work by PCP but unable to get it done until home health anshu blood yesterday. Today too fatigued to walk when taking out dog. Loss of weight/unable to eat. Hx fibromyalgia and chronic fatigue.      Discharge Recommendations:  Patient would benefit from continued therapy after discharge   PT Equipment Recommendations  Equipment Needed: No      Patient Diagnosis(es): The primary encounter diagnosis was Acute pancreatitis, unspecified complication status, unspecified pancreatitis type. A diagnosis of Acute kidney injury (HCC) was also pertinent to this visit.  Past Medical History:  has a past medical history of Anxiety, Chronic fatigue, Chronic fatigue, Diabetes mellitus type 2, diet-controlled (HCC), Fibromyalgia, Fibromyalgia, GERD (gastroesophageal reflux disease), Heavy periods, History of ovarian cyst, and HTN (hypertension).  Past Surgical History:  has a past surgical history that includes Breast biopsy (Right, 2017).    Assessment   Body Structures, Functions, Activity Limitations Requiring Skilled Therapeutic Intervention: Decreased ROM;Decreased strength;Decreased safe awareness;Decreased endurance;Decreased balance;Decreased coordination;Decreased functional mobility   Assessment: Pt being seen for physical therapy evaluation and treatment. Pt admitted with acute pancreatitis . Pt lives alone at home . Prior level of function includes being independent with own ADLs including ambulation without a device; also independent with all IADLs including driving, however, pt has not been driving recently due to dizziness. At time of eval, pt was modified indepdendent in bed

## 2024-07-15 VITALS
DIASTOLIC BLOOD PRESSURE: 78 MMHG | WEIGHT: 144.62 LBS | HEIGHT: 67 IN | SYSTOLIC BLOOD PRESSURE: 152 MMHG | HEART RATE: 88 BPM | TEMPERATURE: 97.9 F | RESPIRATION RATE: 18 BRPM | BODY MASS INDEX: 22.7 KG/M2 | OXYGEN SATURATION: 99 %

## 2024-07-15 LAB
ALBUMIN SERPL-MCNC: 3.9 G/DL (ref 3.5–5.2)
ALBUMIN/GLOB SERPL: 1.7 {RATIO} (ref 1–2.5)
ALP SERPL-CCNC: 61 U/L (ref 35–104)
ALT SERPL-CCNC: 16 U/L (ref 5–33)
ANION GAP SERPL CALCULATED.3IONS-SCNC: 10 MMOL/L (ref 9–17)
AST SERPL-CCNC: 17 U/L
BASOPHILS # BLD: 0.1 K/UL (ref 0–0.2)
BASOPHILS NFR BLD: 1 % (ref 0–2)
BILIRUB SERPL-MCNC: 0.4 MG/DL (ref 0.3–1.2)
BUN SERPL-MCNC: 14 MG/DL (ref 6–20)
CALCIUM SERPL-MCNC: 9 MG/DL (ref 8.6–10.4)
CHLORIDE SERPL-SCNC: 108 MMOL/L (ref 98–107)
CO2 SERPL-SCNC: 20 MMOL/L (ref 20–31)
CREAT SERPL-MCNC: 1.3 MG/DL (ref 0.5–0.9)
EOSINOPHIL # BLD: 0.2 K/UL (ref 0–0.4)
EOSINOPHILS RELATIVE PERCENT: 2 % (ref 1–4)
ERYTHROCYTE [DISTWIDTH] IN BLOOD BY AUTOMATED COUNT: 12.2 % (ref 12.5–15.4)
GFR, ESTIMATED: 49 ML/MIN/1.73M2
GLUCOSE BLD-MCNC: 110 MG/DL (ref 65–105)
GLUCOSE BLD-MCNC: 114 MG/DL (ref 65–105)
GLUCOSE BLD-MCNC: 218 MG/DL (ref 65–105)
GLUCOSE BLD-MCNC: 220 MG/DL (ref 65–105)
GLUCOSE SERPL-MCNC: 137 MG/DL (ref 70–99)
HCT VFR BLD AUTO: 32.4 % (ref 36–46)
HGB BLD-MCNC: 11 G/DL (ref 12–16)
LYMPHOCYTES NFR BLD: 2 K/UL (ref 1–4.8)
LYMPHOCYTES RELATIVE PERCENT: 29 % (ref 24–44)
MCH RBC QN AUTO: 33.2 PG (ref 26–34)
MCHC RBC AUTO-ENTMCNC: 34 G/DL (ref 31–37)
MCV RBC AUTO: 97.7 FL (ref 80–100)
MONOCYTES NFR BLD: 0.5 K/UL (ref 0.1–1.2)
MONOCYTES NFR BLD: 7 % (ref 2–11)
NEUTROPHILS NFR BLD: 61 % (ref 36–66)
NEUTS SEG NFR BLD: 4.3 K/UL (ref 1.8–7.7)
PLATELET # BLD AUTO: 289 K/UL (ref 140–450)
PMV BLD AUTO: 6.8 FL (ref 6–12)
POTASSIUM SERPL-SCNC: 3.9 MMOL/L (ref 3.7–5.3)
PROT SERPL-MCNC: 6.2 G/DL (ref 6.4–8.3)
RBC # BLD AUTO: 3.32 M/UL (ref 4–5.2)
SODIUM SERPL-SCNC: 138 MMOL/L (ref 135–144)
WBC OTHER # BLD: 7 K/UL (ref 3.5–11)

## 2024-07-15 PROCEDURE — 6370000000 HC RX 637 (ALT 250 FOR IP): Performed by: STUDENT IN AN ORGANIZED HEALTH CARE EDUCATION/TRAINING PROGRAM

## 2024-07-15 PROCEDURE — 1200000000 HC SEMI PRIVATE

## 2024-07-15 PROCEDURE — 2580000003 HC RX 258: Performed by: STUDENT IN AN ORGANIZED HEALTH CARE EDUCATION/TRAINING PROGRAM

## 2024-07-15 PROCEDURE — 36415 COLL VENOUS BLD VENIPUNCTURE: CPT

## 2024-07-15 PROCEDURE — 6360000002 HC RX W HCPCS: Performed by: STUDENT IN AN ORGANIZED HEALTH CARE EDUCATION/TRAINING PROGRAM

## 2024-07-15 PROCEDURE — 6370000000 HC RX 637 (ALT 250 FOR IP): Performed by: NURSE PRACTITIONER

## 2024-07-15 PROCEDURE — 99238 HOSP IP/OBS DSCHRG MGMT 30/<: CPT | Performed by: STUDENT IN AN ORGANIZED HEALTH CARE EDUCATION/TRAINING PROGRAM

## 2024-07-15 PROCEDURE — 85025 COMPLETE CBC W/AUTO DIFF WBC: CPT

## 2024-07-15 PROCEDURE — 82947 ASSAY GLUCOSE BLOOD QUANT: CPT

## 2024-07-15 PROCEDURE — 80053 COMPREHEN METABOLIC PANEL: CPT

## 2024-07-15 RX ORDER — ACETAMINOPHEN 650 MG/1
650 SUPPOSITORY RECTAL EVERY 6 HOURS PRN
OUTPATIENT
Start: 2024-07-15

## 2024-07-15 RX ORDER — BISACODYL 5 MG/1
5 TABLET, DELAYED RELEASE ORAL DAILY PRN
Status: CANCELLED | OUTPATIENT
Start: 2024-07-15

## 2024-07-15 RX ORDER — LISINOPRIL 20 MG/1
20 TABLET ORAL DAILY
Status: DISCONTINUED | OUTPATIENT
Start: 2024-07-15 | End: 2024-07-16 | Stop reason: HOSPADM

## 2024-07-15 RX ORDER — IBUPROFEN 200 MG
400 TABLET ORAL EVERY 6 HOURS PRN
Status: CANCELLED | OUTPATIENT
Start: 2024-07-15

## 2024-07-15 RX ORDER — TRAZODONE HYDROCHLORIDE 50 MG/1
50 TABLET ORAL NIGHTLY PRN
Status: CANCELLED | OUTPATIENT
Start: 2024-07-15

## 2024-07-15 RX ORDER — MAGNESIUM HYDROXIDE/ALUMINUM HYDROXICE/SIMETHICONE 120; 1200; 1200 MG/30ML; MG/30ML; MG/30ML
30 SUSPENSION ORAL EVERY 6 HOURS PRN
Status: CANCELLED | OUTPATIENT
Start: 2024-07-15

## 2024-07-15 RX ORDER — ACETAMINOPHEN 325 MG/1
650 TABLET ORAL EVERY 6 HOURS PRN
OUTPATIENT
Start: 2024-07-15

## 2024-07-15 RX ORDER — HYDROXYZINE HYDROCHLORIDE 25 MG/1
50 TABLET, FILM COATED ORAL 3 TIMES DAILY PRN
Status: CANCELLED | OUTPATIENT
Start: 2024-07-15

## 2024-07-15 RX ORDER — SODIUM CHLORIDE 0.9 % (FLUSH) 0.9 %
3 SYRINGE (ML) INJECTION EVERY 8 HOURS
OUTPATIENT
Start: 2024-07-15

## 2024-07-15 RX ORDER — DULOXETIN HYDROCHLORIDE 30 MG/1
30 CAPSULE, DELAYED RELEASE ORAL DAILY
OUTPATIENT
Start: 2024-07-16

## 2024-07-15 RX ORDER — HYDROCHLOROTHIAZIDE 25 MG/1
25 TABLET ORAL DAILY
Status: DISCONTINUED | OUTPATIENT
Start: 2024-07-15 | End: 2024-07-16 | Stop reason: HOSPADM

## 2024-07-15 RX ORDER — ACETAMINOPHEN 325 MG/1
650 TABLET ORAL EVERY 4 HOURS PRN
Status: CANCELLED | OUTPATIENT
Start: 2024-07-15

## 2024-07-15 RX ORDER — ARIPIPRAZOLE 5 MG/1
10 TABLET ORAL DAILY
OUTPATIENT
Start: 2024-07-16

## 2024-07-15 RX ADMIN — SODIUM CHLORIDE, PRESERVATIVE FREE 10 ML: 5 INJECTION INTRAVENOUS at 21:24

## 2024-07-15 RX ADMIN — ARIPIPRAZOLE 10 MG: 5 TABLET ORAL at 08:35

## 2024-07-15 RX ADMIN — DULOXETINE HYDROCHLORIDE 30 MG: 30 CAPSULE, DELAYED RELEASE ORAL at 08:35

## 2024-07-15 RX ADMIN — HEPARIN SODIUM 5000 UNITS: 5000 INJECTION INTRAVENOUS; SUBCUTANEOUS at 14:38

## 2024-07-15 RX ADMIN — INSULIN LISPRO 1 UNITS: 100 INJECTION, SOLUTION INTRAVENOUS; SUBCUTANEOUS at 08:35

## 2024-07-15 RX ADMIN — HEPARIN SODIUM 5000 UNITS: 5000 INJECTION INTRAVENOUS; SUBCUTANEOUS at 22:12

## 2024-07-15 RX ADMIN — SODIUM CHLORIDE, PRESERVATIVE FREE 10 ML: 5 INJECTION INTRAVENOUS at 08:17

## 2024-07-15 RX ADMIN — HEPARIN SODIUM 5000 UNITS: 5000 INJECTION INTRAVENOUS; SUBCUTANEOUS at 06:49

## 2024-07-15 RX ADMIN — LISINOPRIL 20 MG: 20 TABLET ORAL at 21:24

## 2024-07-15 RX ADMIN — HYDROCHLOROTHIAZIDE 25 MG: 25 TABLET ORAL at 21:24

## 2024-07-15 RX ADMIN — ACETAMINOPHEN 650 MG: 325 TABLET ORAL at 06:48

## 2024-07-15 ASSESSMENT — PAIN DESCRIPTION - LOCATION: LOCATION: JAW

## 2024-07-15 ASSESSMENT — PAIN SCALES - GENERAL
PAINLEVEL_OUTOF10: 0
PAINLEVEL_OUTOF10: 5

## 2024-07-15 NOTE — CARE COORDINATION
Request for transport faxed to [facility], patient to transport @ 1030pm  To [facility].  Has pink slip.

## 2024-07-15 NOTE — DISCHARGE SUMMARY
[facility]  Office: [phone]  Magdaleno Ace DO, Arnoldo Smith DO, Junito Hardin DO, Olivier Dsouza DO, Rodrigo Phillips MD, Andreia Bettencourt MD, Niya Solorio MD, Lucia Loyd MD,  Taurus Landa MD, Josef Leonard MD, Nani Guillen MD,  Anthony Bull DO, Roger Cuellar MD, Dagoberto Aguilar MD, Alphonso Ace DO, Radha Webb MD,  Abel Arnold DO, Mireya Kilpatrick MD, Merari Valencia MD, Key Payan MD, Sharmin Roland MD,  Gil Alvarado MD, Delaney Bennett MD, Kendra Roberts MD, Paz Breaux MD, Audie Santos MD, Toni Edmondson MD, Eddie Mcqueen DO, Aaron Nunez DO, Irwin Boo MD,  George Fournier MD, Shirley Waterhouse, CNP,  Emi Young CNP, Ronnie Martinez, CNP,  Christina Link, DNP, Tammie Spivey, CNP, Deanne Connolly, CNP, Swetha Gagnon CNP, Alice Moreno, CNP, Jaquelin Sutton, PA-C, Rosalinda Escobar PA-C, Jolene Garber, CNP, Rose Perez, CNP, Scott Guzman, CNP, Cami Murillo, CNP, Meagan Marquis, CNP, Susan Ledezma, CNS, Princess Wagner, CNP, Alona Roach CNP, Tracy Schwab, CNP         [facility]   IN-PATIENT SERVICE   [facility]    Discharge Summary     Patient ID: Brooke Blake  :  1970   MRN: [id number]     ACCOUNT:  [id number]   Patient's PCP: Albert Julio MD  Admit Date: 2024   Discharge Date: 2024  Length of Stay: 2  Code Status:  Full Code  Admitting Physician: Dagoberto Aguilar MD  Discharge Physician: Dagoberto Aguilar MD     Active Discharge Diagnoses:     Hospital Problem Lists:  Principal Problem:    Acute pancreatitis  Active Problems:    Type 2 diabetes mellitus without complication, without long-term current use of insulin (HCC)    Primary hypertension    Chronic fatigue syndrome with fibromyalgia    Major depressive disorder, single episode, severe without psychosis ([facility])  Resolved Problems:    * No resolved hospital problems. *      Admission Condition:  poor     Discharged Condition:  diet    Activity: As tolerated    Instructions to Patient: Follow-up with psychiatry as scheduled.     Discharge Medications:      Medication List        ASK your doctor about these medications      buPROPion 100 MG extended release tablet  Commonly known as: Wellbutrin SR  Take 1 tablet by mouth 2 times daily     CYMBALTA PO     hydroCHLOROthiazide 12.5 MG tablet     lisinopril 2.5 MG tablet  Commonly known as: PRINIVIL;ZESTRIL     metFORMIN 500 MG tablet  Commonly known as: GLUCOPHAGE  Take 1 tablet by mouth daily (with breakfast)     norethindrone 0.35 MG tablet  Commonly known as: Harika  Take 1 tablet by mouth daily.     tiZANidine 4 MG tablet  Commonly known as: ZANAFLEX  Take 1 tablet by mouth every 6 hours as needed (muscle pain) 8 mg at bed time, pt states she can take up to 32 mg per day if needed.              Time spent on discharge is 20 mins in patient examination, evaluation, counseling as well as medication reconciliation, prescriptions for required medications, discharge plan and follow up.    Electronically signed by   Dagoberto Aguilar MD  7/15/2024  9:27 AM      Thank you Albert Kennedy MD for the opportunity to be involved in this patient's care.

## 2024-07-15 NOTE — PROGRESS NOTES
Occupational Therapy    [facility]  Occupational Therapy Not Seen Note    DATE: 7/15/2024    NAME: Brooke Blake  MRN: [id number]   : 1970      Patient not seen this date for Occupational Therapy due to:    Patient reports completing toileting, grooming and shower without assist while here at hospital. No acute OT recommended at this time. Discharge acute OT.    Electronically signed by ELKIN LINDO OTR/L on 7/15/2024 at 4:09 PM

## 2024-07-15 NOTE — PROGRESS NOTES
Comprehensive Nutrition Assessment    Type and Reason for Visit:  Positive Nutrition Screen    Nutrition Recommendations/Plan:   Continue current diet, encourage PO intake  ONS TID  Monitor weight, intake, labs, skin     Malnutrition Assessment:  Malnutrition Status:  No malnutrition (07/15/24 1520)    Context:  Social/Environmental Circumstances     Findings of the 6 clinical characteristics of malnutrition:  Energy Intake:  Mild decrease in energy intake (Comment)  Weight Loss:  No significant weight loss     Body Fat Loss:  Unable to assess     Muscle Mass Loss:  Unable to assess    Fluid Accumulation:  No significant fluid accumulation     Strength:  Not Performed    Nutrition Assessment:    Pt admitted with acute pancreatitis. Per IDR, pt to transfer to [place] today. PNS recieved for recent weight loss and poor appetite. Weight is stable per EMR, but anticipate recent poor intake. Writer spoke with pt's nurse, James NESS, about changing diet order to safety tray. ONS initiated upon admission. No wounds are noted. No PO intake documented. Pt to likely transfter today.    Nutrition Related Findings:    No edema noted. Cr 1.3, GFR 49, Glu 110. All other labs/med reviewed. Wound Type: None       Current Nutrition Intake & Therapies:    Average Meal Intake: Unable to assess  Average Supplements Intake: Unable to assess  ADULT ORAL NUTRITION SUPPLEMENT; Breakfast, Lunch, Dinner; Clear Liquid Oral Supplement  ADULT DIET; Regular    Anthropometric Measures:  Height: 170.2 cm (5' 7\")  Ideal Body Weight (IBW): 135 lbs (61 kg)    Current Body Weight: 65.5 kg (144 lb 6.4 oz), 107 % IBW.    Current BMI (kg/m2): 22.6  BMI Categories: Normal Weight (BMI 18.5-24.9)    Estimated Daily Nutrient Needs:  Energy Requirements Based On: Kcal/kg  Weight Used for Energy Requirements: Current  Energy (kcal/day): 2517-1489 kcal/day (26-29 kcal/kg)  Weight Used for Protein Requirements: Current  Protein (g/day):  g/day

## 2024-07-15 NOTE — PROGRESS NOTES
SPIRITUAL CARE DEPARTMENT [facility]  PROGRESS NOTE    Room # 314/314-01   Name: Brooke Blake            Denominational: Shinto    Reason for visit: Follow up    I visited the patient.    Admit Date & Time: 7/13/2024  8:21 AM    Assessment:  Brooke Blake is a 53 y.o. female in the hospital because acute pancreatitis. Upon entering the room Pt was welcoming and friendly. Pt was open to conversation and discussion about family zheng and health issues.      Intervention:  I introduced myself and my title as  I offered space for Pt  to express feelings, needs, and concerns and provided a ministry presence.  PROVIDED SUPPORT AND CARE FOR pT. pROVIDED PRAYER AD SCRIPTURE READING AS REQUESTED.  provided empathic listening and encouragement.    Outcome:  Pt  expressed comfort and a sense of peace after visit. Pt thanked  for visit and  will follow up.    Plan:  Chaplains will remain available to offer spiritual and emotional support as needed.    Electronically signed by Chaplain SHANON, on 7/15/2024 at 1:38 PM.  Spiritual Care Department  [facility]      07/15/24 1334   Encounter Summary   Encounter Overview/Reason Spiritual/Emotional Needs;Follow-up   Service Provided For Patient   Referral/Consult From Nurse   Support System Friends/neighbors   Last Encounter  07/14/24   Complexity of Encounter High   Begin Time 1840   End Time  1905   Total Time Calculated 25 min   Crisis   Type Trauma;Emotional distress;Relationship concerns;Follow up   Spiritual/Emotional needs   Type Spiritual Support;Spiritual Distress;Emotional Distress   Grief, Loss, and Adjustments   Type Life Adjustments;Adjustment to illness   Palliative Care   Type Palliative Care, Follow-up   Assessment/Intervention/Outcome   Assessment Anxious;Coping;Decisional conflict;Fearful;Despair;Loneliness;Sad;Stress overload   Intervention Active

## 2024-07-15 NOTE — PROGRESS NOTES
SPIRITUAL CARE DEPARTMENT - [facility]  PROGRESS NOTE    Room # 314/314-01   Name: Brooke Blake            Evangelical: Worship     Reason for visit:  Social Isolation/Lonliness    I visited the patient.    Admit Date & Time: 7/13/2024  8:21 AM    Assessment:  Brooke Blake is a 53 y.o. female in the hospital because \"acute pancreatitits\". Upon entering the room pt was lying in bed. Pt shared about her current medical and emotional challenges. Pt shared wishes to find more support to help her through this difficult season of life. Pt shared she is currently \"going through a divorce\" Pt also shared that she has one daughter who lives in [facility]. Pt welcomed this writer to pray with and for her.       Intervention:  I introduced myself and my title as  I offered space for pt  to express feelings, needs, and concerns and provided a ministry presence. Writer actively listened to pt and provided emotional and spiritual support. Writer also prayed with and for pt.     Outcome:  Pt expressed her desire to continue to find support during this season of her life. Pt expressed gratitude to writer for visit and for prayers.     Plan:  Chaplains will remain available to offer spiritual and emotional support as needed.    Electronically signed by Chaplain Haley, on 7/15/2024 at 10:42 AM.  Spiritual Care Department  [facility]       07/15/24 1039   Encounter Summary   Encounter Overview/Reason Loneliness/Social Isolation   Service Provided For Patient   Referral/Consult From [facility]   Support System Children   Last Encounter  07/15/24   Complexity of Encounter Moderate   Begin Time 1020   End Time  1035   Total Time Calculated 15 min   Spiritual/Emotional needs   Type Spiritual Support   Assessment/Intervention/Outcome   Assessment Calm;Impaired resilience   Intervention Active listening;Sustaining Presence/Ministry of presence;Prayer (assurance of)/[place];Nurtured

## 2024-07-15 NOTE — PROGRESS NOTES
SPIRITUAL CARE DEPARTMENT [facility]  PROGRESS NOTE    Room # 314/314-01   Name: Brooke Blake            Hindu: Religious    Reason for visit: Emotional Distress    I visited the patient.    Admit Date & Time: 7/13/2024  8:21 AM    Assessment:  Brooke Blake is a 53 y.o. female in the hospital because Acute pancreatitis. Upon entering the room  entered room with Pt awake and eating dinner. Nurse was present during visit.Pt was open to conversation and discussion concerning moving forward.      Intervention:  I introduced myself and my title as  I offered space for Pt  to express feelings, needs, and concerns and provided a ministry presence.  provided options for healing and support. Provided support and empathic listening Prayer was offered to Pt and Pt was encouraged.    Outcome:  Pt expressed thanks for  visit. Pt expressed concern over finding hope and support in the near future.    Plan:  Chaplains will remain available to offer spiritual and emotional support as needed.    Electronically signed by Chaplain SHANON, on 7/15/2024 at 6:56 PM.  Spiritual Care Department  [facility]      07/15/24 0332   Encounter Summary   Encounter Overview/Reason Spiritual/Emotional Needs;Grief, Loss, and Adjustments;Follow-up   Service Provided For Patient   Support System Friends/neighbors   Last Encounter  07/15/24   Complexity of Encounter High   Begin Time 1700   End Time  1720   Total Time Calculated 20 min   Crisis   Type Trauma;Emotional distress   Spiritual/Emotional needs   Type Spiritual Support;Emotional Distress   Grief, Loss, and Adjustments   Type Life Adjustments;Adjustment to illness   Assessment/Intervention/Outcome   Assessment Anxious;Coping   Intervention Active listening;Discussed relationship with God;Discussed meaning/purpose;Nurtured Hope;Prayer (assurance of)/[place];Read/Provided Scripture   Outcome  Comfort;Coping;Encouraged;Acceptance   Plan and Referrals   Plan/Referrals Continue to visit, (comment);Continue Support (comment)

## 2024-07-15 NOTE — PLAN OF CARE
Nutrition Problem #1: Predicted inadequate energy intake  Intervention: Food and/or Nutrient Delivery: Continue Current Diet, Continue Oral Nutrition Supplement  Nutritional Goal: At least 50% PO intake prior to discharge

## 2024-07-16 ENCOUNTER — HOSPITAL ENCOUNTER (INPATIENT)
Age: 54
LOS: 2 days | Discharge: HOME | End: 2024-07-18
Attending: PSYCHIATRY & NEUROLOGY | Admitting: PSYCHIATRY & NEUROLOGY
Payer: COMMERCIAL

## 2024-07-16 PROBLEM — F32.A DEPRESSION WITH SUICIDAL IDEATION: Status: ACTIVE | Noted: 2024-07-16

## 2024-07-16 PROBLEM — R45.851 DEPRESSION WITH SUICIDAL IDEATION: Status: ACTIVE | Noted: 2024-07-16

## 2024-07-16 PROBLEM — F33.2 MAJOR DEPRESSIVE DISORDER, RECURRENT SEVERE WITHOUT PSYCHOTIC FEATURES (HCC): Status: ACTIVE | Noted: 2024-07-16

## 2024-07-16 LAB
CORTIS SERPL-MCNC: 12.7 UG/DL (ref 2.5–19.5)
CORTISOL COLLECTION INFO: NORMAL
CRP SERPL HS-MCNC: <3 MG/L (ref 0–5)
GLUCOSE BLD-MCNC: 151 MG/DL (ref 65–105)
GLUCOSE BLD-MCNC: 175 MG/DL (ref 65–105)

## 2024-07-16 PROCEDURE — 82533 TOTAL CORTISOL: CPT

## 2024-07-16 PROCEDURE — APPSS60 APP SPLIT SHARED TIME 46-60 MINUTES: Performed by: NURSE PRACTITIONER

## 2024-07-16 PROCEDURE — 6370000000 HC RX 637 (ALT 250 FOR IP): Performed by: STUDENT IN AN ORGANIZED HEALTH CARE EDUCATION/TRAINING PROGRAM

## 2024-07-16 PROCEDURE — 2580000003 HC RX 258: Performed by: INTERNAL MEDICINE

## 2024-07-16 PROCEDURE — 1240000000 HC EMOTIONAL WELLNESS R&B

## 2024-07-16 PROCEDURE — 2580000003 HC RX 258: Performed by: NURSE PRACTITIONER

## 2024-07-16 PROCEDURE — 6370000000 HC RX 637 (ALT 250 FOR IP): Performed by: NURSE PRACTITIONER

## 2024-07-16 PROCEDURE — 99222 1ST HOSP IP/OBS MODERATE 55: CPT | Performed by: PSYCHIATRY & NEUROLOGY

## 2024-07-16 PROCEDURE — 86140 C-REACTIVE PROTEIN: CPT

## 2024-07-16 PROCEDURE — 6370000000 HC RX 637 (ALT 250 FOR IP): Performed by: PSYCHIATRY & NEUROLOGY

## 2024-07-16 PROCEDURE — 82947 ASSAY GLUCOSE BLOOD QUANT: CPT

## 2024-07-16 PROCEDURE — 36415 COLL VENOUS BLD VENIPUNCTURE: CPT

## 2024-07-16 PROCEDURE — 6370000000 HC RX 637 (ALT 250 FOR IP)

## 2024-07-16 PROCEDURE — 99222 1ST HOSP IP/OBS MODERATE 55: CPT | Performed by: INTERNAL MEDICINE

## 2024-07-16 RX ORDER — MIDODRINE HYDROCHLORIDE 10 MG/1
10 TABLET ORAL ONCE
Status: COMPLETED | OUTPATIENT
Start: 2024-07-16 | End: 2024-07-16

## 2024-07-16 RX ORDER — TRAZODONE HYDROCHLORIDE 50 MG/1
50 TABLET ORAL NIGHTLY PRN
Status: DISCONTINUED | OUTPATIENT
Start: 2024-07-16 | End: 2024-07-18 | Stop reason: HOSPADM

## 2024-07-16 RX ORDER — ARIPIPRAZOLE 10 MG/1
10 TABLET ORAL DAILY
Status: DISCONTINUED | OUTPATIENT
Start: 2024-07-16 | End: 2024-07-18 | Stop reason: HOSPADM

## 2024-07-16 RX ORDER — ACETAMINOPHEN 325 MG/1
650 TABLET ORAL EVERY 4 HOURS PRN
Status: DISCONTINUED | OUTPATIENT
Start: 2024-07-16 | End: 2024-07-18 | Stop reason: HOSPADM

## 2024-07-16 RX ORDER — 0.9 % SODIUM CHLORIDE 0.9 %
1000 INTRAVENOUS SOLUTION INTRAVENOUS ONCE
Status: COMPLETED | OUTPATIENT
Start: 2024-07-16 | End: 2024-07-16

## 2024-07-16 RX ORDER — 0.9 % SODIUM CHLORIDE 0.9 %
500 INTRAVENOUS SOLUTION INTRAVENOUS ONCE
Status: COMPLETED | OUTPATIENT
Start: 2024-07-16 | End: 2024-07-16

## 2024-07-16 RX ORDER — BISACODYL 5 MG/1
5 TABLET, DELAYED RELEASE ORAL DAILY PRN
Status: DISCONTINUED | OUTPATIENT
Start: 2024-07-16 | End: 2024-07-18 | Stop reason: HOSPADM

## 2024-07-16 RX ORDER — DULOXETIN HYDROCHLORIDE 30 MG/1
30 CAPSULE, DELAYED RELEASE ORAL DAILY
Status: DISCONTINUED | OUTPATIENT
Start: 2024-07-16 | End: 2024-07-17

## 2024-07-16 RX ORDER — MIDODRINE HYDROCHLORIDE 5 MG/1
10 TABLET ORAL ONCE
Status: DISCONTINUED | OUTPATIENT
Start: 2024-07-16 | End: 2024-07-16

## 2024-07-16 RX ORDER — IBUPROFEN 400 MG/1
400 TABLET ORAL EVERY 6 HOURS PRN
Status: DISCONTINUED | OUTPATIENT
Start: 2024-07-16 | End: 2024-07-18 | Stop reason: HOSPADM

## 2024-07-16 RX ORDER — HYDROXYZINE 50 MG/1
50 TABLET, FILM COATED ORAL 3 TIMES DAILY PRN
Status: DISCONTINUED | OUTPATIENT
Start: 2024-07-16 | End: 2024-07-18 | Stop reason: HOSPADM

## 2024-07-16 RX ORDER — MAGNESIUM HYDROXIDE/ALUMINUM HYDROXICE/SIMETHICONE 120; 1200; 1200 MG/30ML; MG/30ML; MG/30ML
30 SUSPENSION ORAL EVERY 6 HOURS PRN
Status: DISCONTINUED | OUTPATIENT
Start: 2024-07-16 | End: 2024-07-18 | Stop reason: HOSPADM

## 2024-07-16 RX ORDER — FLUDROCORTISONE ACETATE 0.1 MG/1
0.1 TABLET ORAL DAILY
Status: DISCONTINUED | OUTPATIENT
Start: 2024-07-16 | End: 2024-07-17

## 2024-07-16 RX ADMIN — DULOXETINE HYDROCHLORIDE 30 MG: 30 CAPSULE, DELAYED RELEASE ORAL at 15:38

## 2024-07-16 RX ADMIN — ARIPIPRAZOLE 10 MG: 10 TABLET ORAL at 15:38

## 2024-07-16 RX ADMIN — SODIUM CHLORIDE 1000 ML: 9 INJECTION, SOLUTION INTRAVENOUS at 05:00

## 2024-07-16 RX ADMIN — SODIUM CHLORIDE 1000 ML: 9 INJECTION, SOLUTION INTRAVENOUS at 07:46

## 2024-07-16 RX ADMIN — TIZANIDINE 8 MG: 4 TABLET ORAL at 02:57

## 2024-07-16 RX ADMIN — MIDODRINE HYDROCHLORIDE 10 MG: 10 TABLET ORAL at 05:47

## 2024-07-16 RX ADMIN — TRAZODONE HYDROCHLORIDE 50 MG: 50 TABLET ORAL at 22:04

## 2024-07-16 RX ADMIN — ACETAMINOPHEN 650 MG: 325 TABLET ORAL at 00:10

## 2024-07-16 RX ADMIN — SODIUM CHLORIDE 500 ML: 9 INJECTION, SOLUTION INTRAVENOUS at 06:20

## 2024-07-16 ASSESSMENT — PAIN DESCRIPTION - DESCRIPTORS: DESCRIPTORS: DISCOMFORT

## 2024-07-16 ASSESSMENT — PAIN SCALES - GENERAL
PAINLEVEL_OUTOF10: 0
PAINLEVEL_OUTOF10: 0
PAINLEVEL_OUTOF10: 3

## 2024-07-16 ASSESSMENT — LIFESTYLE VARIABLES
HOW OFTEN DO YOU HAVE A DRINK CONTAINING ALCOHOL: NEVER
HOW MANY STANDARD DRINKS CONTAINING ALCOHOL DO YOU HAVE ON A TYPICAL DAY: PATIENT DOES NOT DRINK
HOW MANY STANDARD DRINKS CONTAINING ALCOHOL DO YOU HAVE ON A TYPICAL DAY: 1 OR 2
HOW OFTEN DO YOU HAVE A DRINK CONTAINING ALCOHOL: MONTHLY OR LESS

## 2024-07-16 ASSESSMENT — SLEEP AND FATIGUE QUESTIONNAIRES
DO YOU USE A SLEEP AID: NO
DO YOU HAVE DIFFICULTY SLEEPING: NO
AVERAGE NUMBER OF SLEEP HOURS: 7

## 2024-07-16 ASSESSMENT — PAIN DESCRIPTION - LOCATION: LOCATION: TEETH

## 2024-07-16 NOTE — PROGRESS NOTES
Behavioral Services  Medicare Certification Upon Admission    I certify that this patient's inpatient psychiatric hospital admission is medically necessary for:    [x] (1) Treatment which could reasonably be expected to improve this patient's condition,       [x] (2) Or for diagnostic study;     AND     [x](2) The inpatient psychiatric services are provided while the individual is under the care of a physician and are included in the individualized plan of care.    Estimated length of stay/service 3 to 5 days    Plan for post-hospital care home with outpatient community mental health follow-up    Electronically signed by TOMA WEATHERS MD on 7/16/2024 at 4:14 PM

## 2024-07-16 NOTE — GROUP NOTE
Psych-Ed/Relapse Prevention Group Note        Date: July 16, 2024 Start Time: 9am  End Time:  0930      Number of Participants in Group & Unit Census:  5/16    Topic: Goal setting    Goal of Group:Patient will identify daily goal and demonstrate understanding of unit guidelines and schedule      Comments:     Patient did not participate in Community Meeting group, despite staff encouragement and explanation of benefits.  Patient remain seclusive to self.  Q15 minute safety checks maintained for patient safety and will continue to encourage patient to attend unit programming.           Signature:  TAMAR GrossS

## 2024-07-16 NOTE — H&P
interfere with her other medications.  She states that she has been drinking more alcohol recently, but denies any history of alcohol abuse.     Brooke is a risk to herself and requires inpatient hospitalization for stability.           History of head trauma: [] Yes [x] No    History of seizures: [] Yes [x] No    History of violence or aggression: [] Yes [x] No         PSYCHIATRIC HISTORY:  [x] Yes [] No    Currently follows with a counselor Herbert Fernandez.  States that she sees her once a week.  Denies any lifetime suicide attempts  This is a first-time psychiatric hospital admission.    Home Medication Compliance: [x] Yes [] No    Past psychiatric medications includes:   Aripiprazole and duloxetine    Adverse reactions from psychotropic medications: [] Yes [x] No         Lifetime Psychiatric Review of Systems         Obsessions and Compulsions: Denies       Jailyn or Hypomania: Denies     Hallucinations: Denies     Panic Attacks:  Denies     Delusions:  Denies     Phobias:  Denies    Past Medical History:        Diagnosis Date    Anxiety     Chronic fatigue     Chronic fatigue     Diabetes mellitus type 2, diet-controlled (HCC)     Fibromyalgia     Fibromyalgia     GERD (gastroesophageal reflux disease)     Heavy periods     History of ovarian cyst     HTN (hypertension)        Past Surgical History:        Procedure Laterality Date    BREAST BIOPSY Right 02/14/2017    Sterotactic Rt Breast        Allergies:  Methylisothiazolinone, Misc natural products, Nickel, Palm oil, Polyethylene glycol, Neosporin [bacitracin-neomycin-polymyxin], and Vitamin a palmitate [vitamin a]         Social History:       Patient states she lives alone. Recently filed for divorce after 32 years of marriage. Has 1 daughter that she is estranged from. Has been working at [employer] as an  for the past 15 months. Feels isolated and alone.  Worries that she will not be able to continue working secondary to her  milieu  Electronically signed by TOMA WEATHERS MD on 7/16/2024 at 4:16 PM

## 2024-07-16 NOTE — GROUP NOTE
Group Therapy Note    Date: 7/16/2024    Group Start Time: 1000  Group End Time: 1035  Group Topic: Psychoeducation    Jessica Joy, HSENW        Group Therapy Note    Attendees: 6/15       patient refused to attend psychoeducation group at 10a after encouragement from staff.  1:1 talk time provided as alternative to group session

## 2024-07-16 NOTE — PROGRESS NOTES
RT ASSESSMENT TREATMENT GOALS    [x]Pt Goal:  Pt will identify 1-2 positive coping skills by time of discharge.    [x]Pt Goal:  Pt will identify 1-2 positive aspects of self by time of discharge.    []Pt Goal:  Pt will remain on task/topic for 15-30 minutes during group by time of discharge.    []Pt Goal:  Pt will identify 1-2 aspects of relapse prevention plan by time of discharge.    []Pt Goal:  Pt will join in conversation with peers 1-2 times per group by time of discharge.    []Pt Goal:  Pt will identify 1-2 new leisure interests by time of discharge.    []Pt Goal: Pt will maintain behavorial control until the time of discharge.

## 2024-07-16 NOTE — GROUP NOTE
Group Therapy Note    Date: 7/16/2024    Group Start Time: 1330  Group End Time: 1415  Group Topic: Psychoeducation    [facility] Lata Glover CTRS    Group Therapy Note    Attendees: 7/16     Patient's Goal:  Patient will identify benefits of music and creative expression for coping.    Notes:  Patient attended group and participated    Status After Intervention:  Improved    Participation Level: Active Listener and Interactive    Participation Quality: Appropriate, Attentive      Speech:  normal      Thought Process/Content: Logical  Linear      Affective Functioning: Constricted/Restricted      Mood: euthymic      Level of consciousness:  Alert, Oriented x4, and Attentive      Response to Learning: Able to verbalize current knowledge/experience, Able to verbalize/acknowledge new learning      Endings: None Reported    Modes of Intervention: Education, Support, Socialization, and Exploration      Discipline Responsible: Psychoeducational Specialist      Signature:  VENTURA Gross

## 2024-07-16 NOTE — CARE COORDINATION
BHI Biopsychosocial Assessment    Current Level of Psychosocial Functioning     Independent xxx  Dependent    Minimal Assist     Comments:    Psychosocial High Risk Factors (check all that apply)    Unable to obtain meds   Chronic illness/pain  xx  Substance abuse   Lack of Family Support xx  Financial stress xx  Isolation   Inadequate Community Resources xx  Suicide attempt(s)  Not taking medications   Victim of crime   Developmental Delay  Unable to manage personal needs    Age 65 or older   Homeless  No transportation   Readmission within 30 days  Unemployment  Traumatic Event xx    Comments:   Psychiatric Advanced Directives: none reported     Family to Involve in Treatment: states her therapist as a support, her adult daughter is supportive    Sexual Orientation:  n/a    Patient Strengths: has stable housing, employment, is linked with therapist     Patient Barriers: not linked with outpatient psychiatrist/prescriber, recent separation from her        Opiate Education Provided:  denies       CMHC/mental health history: linked with Rosio Fernandez [facility] therapist in [place]     Plan of Care   medication management, group/individual therapies, family meetings, psycho -education, treatment team meetings to assist with stabilization    Initial Discharge Plan:  return home       Clinical Summary:  Brooke is a 53 year old single female who has been admitted to [facility] with report of increased depression and suicidal ideation. Brooke states she is recently  from her  of 32 years, she states she has 1 daughter who lives in [facility] and with whom she has little contact. She states she works as a  and some difficulty with her fiances. She denies legal concerns, denies AOD issues. She is linked with a private practice therapist but not psychiatrist. Ongoing support and encouragement provided.

## 2024-07-16 NOTE — PLAN OF CARE
Problem: Discharge Planning  Goal: Discharge to home or other facility with appropriate resources  Outcome: Progressing  Flowsheets (Taken 7/15/2024 2000)  Discharge to home or other facility with appropriate resources: Identify barriers to discharge with patient and caregiver     Problem: Pain  Goal: Verbalizes/displays adequate comfort level or baseline comfort level  Outcome: Progressing     Problem: Safety - Adult  Goal: Free from fall injury  Outcome: Progressing     Problem: Skin/Tissue Integrity  Goal: Absence of new skin breakdown  Description: 1.  Monitor for areas of redness and/or skin breakdown  2.  Assess vascular access sites hourly  3.  Every 4-6 hours minimum:  Change oxygen saturation probe site  4.  Every 4-6 hours:  If on nasal continuous positive airway pressure, respiratory therapy assess nares and determine need for appliance change or resting period.  Outcome: Progressing     Problem: Self Harm/Suicidality  Goal: Will have no self-injury during hospital stay  Description: INTERVENTIONS:  1.  Ensure constant observer at bedside with Q15M safety checks  2.  Maintain a safe environment  3.  Secure patient belongings  4.  Ensure family/visitors adhere to safety recommendations  5.  Ensure safety tray has been added to patient's diet order  6.  Every shift and PRN: Re-assess suicidal risk via Frequent Screener    Outcome: Progressing     Problem: Chronic Conditions and Co-morbidities  Goal: Patient's chronic conditions and co-morbidity symptoms are monitored and maintained or improved  Outcome: Progressing  Flowsheets (Taken 7/15/2024 2000)  Care Plan - Patient's Chronic Conditions and Co-Morbidity Symptoms are Monitored and Maintained or Improved: Monitor and assess patient's chronic conditions and comorbid symptoms for stability, deterioration, or improvement     Problem: Nutrition Deficit:  Goal: Optimize nutritional status  7/16/2024 0021 by Cami Moreno RN  Outcome: Progressing

## 2024-07-16 NOTE — GROUP NOTE
Psych-Ed/Relapse Prevention Group Note        Date: July 16, 2024 Start Time: 11am  End Time: 11:45am      Number of Participants in Group & Unit Census:  6/15    Topic: Leisure skills    Goal of Group:Patient will identify benefits of leisure for coping and stress management      Comments:     Patient did not participate in Psych-Ed/Relapse Prevention group, despite staff encouragement and explanation of benefits.  Patient remain seclusive to self.  Q15 minute safety checks maintained for patient safety and will continue to encourage patient to attend unit programming.         Signature:  Lata Romo, CTRS

## 2024-07-16 NOTE — H&P
[facility] Internal Medicine  Marcus Arroyo MD; Rylan Pradhan MD, Panfilo Khan MD, Salina Orosco MD, Amna Da Silva MD; Donato Marie MD    [facility] Internal Medicine   IN-PATIENT SERVICE   [facility]     HISTORY AND PHYSICAL EXAMINATION            Date:   7/16/2024  Patient name:  Brooke Blake  Date of admission:  7/16/2024  3:55 AM  MRN:   [id number]  Account:  [id number]  YOB: 1970  PCP:    Albert Julio MD  Room:   [address] [place] [address]  Code Status:    Prior      Chief Complaint:     Hypotension      History Obtained From:     Patient/EMR/bedside RN     History of Present Illness:     53-year-old  lady with history of diabetes mellitus last A1c 3 years ago 6.9 on metformin history of hypertension was admitted to [facility] 2 days ago [place] noted to have pancreatitis on CT scan lipase was at 147 decreased to 98 discharge to behavioral health unit where patient was noted to have hypotension systolic 70/30 no significant tachycardia patient has some dizziness no fever chills no nausea vomiting patient has been taking antihypertensive medication and also Flexeril      Past Medical History:     Past Medical History:   Diagnosis Date    Anxiety     Chronic fatigue     Chronic fatigue     Diabetes mellitus type 2, diet-controlled (HCC)     Fibromyalgia     Fibromyalgia     GERD (gastroesophageal reflux disease)     Heavy periods     History of ovarian cyst     HTN (hypertension)         Past Surgical History:     Past Surgical History:   Procedure Laterality Date    BREAST BIOPSY Right 02/14/2017    Sterotactic Rt Breast         Medications Prior to Admission:     Prior to Admission medications    Medication Sig Start Date End Date Taking? Authorizing Provider   lisinopril (PRINIVIL;ZESTRIL) 2.5 MG tablet Take 8 tablets by mouth daily    Provider, MD Jesse   hydroCHLOROthiazide 12.5 MG tablet Take 2

## 2024-07-16 NOTE — PROGRESS NOTES
Comprehensive Nutrition Assessment    Type and Reason for Visit:  Consult (poor appetite)    Nutrition Recommendations/Plan:   Will provide 5 carbohydrate choices per tray and change supplement to Glucerna all trays     Malnutrition Assessment:  Malnutrition Status:  At risk for malnutrition (Comment) (07/16/24 1222)    Context:  Acute Illness     Findings of the 6 clinical characteristics of malnutrition:  Energy Intake:  50% or less of estimated energy requirements for 5 or more days  Weight Loss:  Unable to assess     Body Fat Loss:  Unable to assess     Muscle Mass Loss:  Unable to assess    Fluid Accumulation:  No significant fluid accumulation     Strength:  Not Performed    Nutrition Assessment:    Pt was admitted to [facility] due to mental health reasons after being admitted to [facility] due to pancreatitis. Pt has been having severe reflux causing nausea when pt has po intake. Unable to assess for wt loss due to no wt history available.    Nutrition Related Findings:    no edema, Labs: POC Glu 175, Meds: Reviewed Wound Type: None       Current Nutrition Intake & Therapies:    Average Meal Intake: 1-25%     ADULT DIET; Regular  ADULT ORAL NUTRITION SUPPLEMENT; Breakfast, Lunch, Dinner; Standard High Calorie/High Protein Oral Supplement    Anthropometric Measures:  Height: 170.2 cm (5' 7\")  Ideal Body Weight (IBW): 135 lbs (61 kg)    Admission Body Weight: 65.3 kg (144 lb)  Current Body Weight: 65.3 kg (144 lb), 106.7 % IBW. Weight Source: Stated  Current BMI (kg/m2): 22.5  Usual Body Weight: 68.9 kg (152 lb) (2021)  % Weight Change (Calculated): -5.3                    BMI Categories: Normal Weight (BMI 18.5-24.9)    Estimated Daily Nutrient Needs:  Energy Requirements Based On: Formula  Weight Used for Energy Requirements: Admission  Energy (kcal/day): Douglas x 1.2= 1600 kcal  Weight Used for Protein Requirements: Admission  Protein (g/day): 1.4g/kg= 90-95 g  Method Used for Fluid Requirements: 1

## 2024-07-16 NOTE — PLAN OF CARE
[facility]  Initial Interdisciplinary Treatment Plan NO      Original treatment plan Date & Time: 7/16/24 1203    Admission Type:  Admission Type: Involuntary    Reason for admission:   Reason for Admission: Depression with SI. Unable to care for self at home, increase stress due to recent divorce.    Estimated Length of Stay:  5-7days  Estimated Discharge Date: to be determined by physician    PATIENT STRENGTHS:  Patient Strengths:   Patient Strengths and Limitations:   Addictive Behavior: Addictive Behavior  In the Past 3 Months, Have You Felt or Has Someone Told You That You Have a Problem With  : None  Medical Problems:  Past Medical History:   Diagnosis Date    Anxiety     Chronic fatigue     Chronic fatigue     Diabetes mellitus type 2, diet-controlled (HCC)     Fibromyalgia     Fibromyalgia     GERD (gastroesophageal reflux disease)     Heavy periods     History of ovarian cyst     HTN (hypertension)      Status EXAM:Mental Status and Behavioral Exam  Normal: No  Level of Assistance: Independent/Self  Facial Expression: Flat, Sad, Worried  Affect: Constricted  Level of Consciousness: Alert  Frequency of Checks: 4 times per hour, close  Mood:Normal: No  Mood: Depressed, Anxious, Sad, Helpless  Motor Activity:Normal: No  Motor Activity: Decreased, Unusual posture/gait  Eye Contact: Good  Observed Behavior: Withdrawn  Sexual Misconduct History: Current - no  Preception: Saline to person, Saline to time, Saline to place, Saline to situation  Attention:Normal: No  Attention: Unable to concentrate  Thought Processes: Circumstantial  Thought Content:Normal: No  Thought Content: Preoccupations  Depression Symptoms: Feelings of helplessness, Feelings of hopelessess, Loss of interest  Anxiety Symptoms: Generalized  Jailyn Symptoms: No problems reported or observed.  Hallucinations: None  Delusions: No  Memory:Normal: Yes  Insight and Judgment: No  Insight and Judgment: Poor judgment, Poor

## 2024-07-16 NOTE — PROGRESS NOTES
Pharmacy Medication History Note      List of current medications patient is taking is complete.    Source of information: [facility], [place], [facility] admission    Changes made to medication list:  Medications removed (include reason, ex. therapy complete or physician discontinued, noncompliance):  None    Medications flagged for provider review:  Bupropion- not taking  Metformin- not taking  Norethindrone- not taking    Medications added/doses adjusted:  Adjust: Tizanidine 4 mg take 2 tabs nightly prn     Other notes (ex. Recent course of antibiotics, Coumadin dosing):  OARRS negative  Per [facility], patient filled on 4/9 lisinopril 20 mg for 30 supply, filled on 4/3 Hydrochlorothiazide 25 mg for 90 day supply. Patient received these medications at [facility] and is now having issues with hypotension.       Current Home Medication List at Time of Admission:  Prior to Admission medications    Medication Sig   lisinopril (PRINIVIL;ZESTRIL) 2.5 MG tablet Take 8 tablets by mouth daily   hydroCHLOROthiazide 12.5 MG tablet Take 2 tablets by mouth daily   tiZANidine (ZANAFLEX) 4 MG tablet Take 1 tablet by mouth every 6 hours as needed (muscle pain) 8 mg at bed time, pt states she can take up to 32 mg per day if needed.  Patient taking differently: Take 2 tablets by mouth nightly as needed (muscle pain) 8 mg at bed time, pt states she can take up to 32 mg per day if needed.         Please let me know if you have any questions about this encounter. Thank you!    Electronically signed by Alana Lin RPH on 7/16/2024 at 10:25 AM

## 2024-07-16 NOTE — PROGRESS NOTES
Received phone call from RN. Patient arrived on unit and is lethargic. Manual BP in 60/48 Per MAR at [facility] patient was restarted on home blood pressure medications that were held for several days while being treated for FIONA. She received HCTZ 25 mg PO and Lisinopril 20 mg PO at 2124. Prior to administering the medications BP was 152/78 . There is not any documented BP after administration. Patient was discharged to [facility] psych unit just after 3 am.     Ordered placed for 1x dose of midodrine 10 mg. No medical bed available. Spoke with house supervisor, she will place IV to administer 1L NS bolus.

## 2024-07-17 PROCEDURE — 99232 SBSQ HOSP IP/OBS MODERATE 35: CPT | Performed by: INTERNAL MEDICINE

## 2024-07-17 PROCEDURE — 6370000000 HC RX 637 (ALT 250 FOR IP)

## 2024-07-17 PROCEDURE — 99232 SBSQ HOSP IP/OBS MODERATE 35: CPT | Performed by: PSYCHIATRY & NEUROLOGY

## 2024-07-17 PROCEDURE — 90833 PSYTX W PT W E/M 30 MIN: CPT | Performed by: PSYCHIATRY & NEUROLOGY

## 2024-07-17 PROCEDURE — APPSS30 APP SPLIT SHARED TIME 16-30 MINUTES: Performed by: NURSE PRACTITIONER

## 2024-07-17 PROCEDURE — 6370000000 HC RX 637 (ALT 250 FOR IP): Performed by: PSYCHIATRY & NEUROLOGY

## 2024-07-17 PROCEDURE — 1240000000 HC EMOTIONAL WELLNESS R&B

## 2024-07-17 PROCEDURE — 6370000000 HC RX 637 (ALT 250 FOR IP): Performed by: NURSE PRACTITIONER

## 2024-07-17 RX ORDER — TIZANIDINE 4 MG/1
4 TABLET ORAL NIGHTLY
Status: DISCONTINUED | OUTPATIENT
Start: 2024-07-17 | End: 2024-07-18 | Stop reason: HOSPADM

## 2024-07-17 RX ORDER — DULOXETIN HYDROCHLORIDE 20 MG/1
40 CAPSULE, DELAYED RELEASE ORAL DAILY
Status: DISCONTINUED | OUTPATIENT
Start: 2024-07-18 | End: 2024-07-18 | Stop reason: HOSPADM

## 2024-07-17 RX ORDER — ARIPIPRAZOLE 10 MG/1
10 TABLET ORAL DAILY
Qty: 30 TABLET | Refills: 3 | Status: SHIPPED | OUTPATIENT
Start: 2024-07-18

## 2024-07-17 RX ORDER — DULOXETINE 40 MG/1
40 CAPSULE, DELAYED RELEASE ORAL DAILY
Qty: 30 CAPSULE | Refills: 0 | Status: SHIPPED | OUTPATIENT
Start: 2024-07-18

## 2024-07-17 RX ADMIN — TRAZODONE HYDROCHLORIDE 50 MG: 50 TABLET ORAL at 22:38

## 2024-07-17 RX ADMIN — TIZANIDINE 4 MG: 4 TABLET ORAL at 22:35

## 2024-07-17 RX ADMIN — ARIPIPRAZOLE 10 MG: 10 TABLET ORAL at 08:30

## 2024-07-17 RX ADMIN — DULOXETINE HYDROCHLORIDE 30 MG: 30 CAPSULE, DELAYED RELEASE ORAL at 08:30

## 2024-07-17 ASSESSMENT — PAIN SCALES - GENERAL: PAINLEVEL_OUTOF10: 0

## 2024-07-17 ASSESSMENT — LIFESTYLE VARIABLES
HOW MANY STANDARD DRINKS CONTAINING ALCOHOL DO YOU HAVE ON A TYPICAL DAY: PATIENT DOES NOT DRINK
HOW OFTEN DO YOU HAVE A DRINK CONTAINING ALCOHOL: NEVER

## 2024-07-17 NOTE — GROUP NOTE
Group Therapy Note    Date: 7/17/2024    Group Start Time: 0900  Group End Time: 0930  Group Topic: Community Meeting    Eric iHnes LPN        Group Therapy Note    Attendees: 5/17       Patient's Goal:  discharge planning    Status After Intervention:  Improved    Participation Level: Interactive    Participation Quality: Appropriate      Speech:  normal      Thought Process/Content: Logical      Affective Functioning: Congruent      Mood: euthymic      Level of consciousness:  Alert and Oriented x4      Response to Learning: Able to verbalize current knowledge/experience      Endings: None Reported    Modes of Intervention: Education and Support      Discipline Responsible: Licensed Practical Nurse      Signature:  Eric Dozier LPN

## 2024-07-17 NOTE — PLAN OF CARE
Problem: Chronic Conditions and Co-morbidities  Goal: Patient's chronic conditions and co-morbidity symptoms are monitored and maintained or improved  7/17/2024 1036 by Marbin Bhardwaj LPN  Outcome: Progressing  7/16/2024 2228 by Ramesh Tom, RN  Outcome: Progressing  Note: Patient has been drinking fluids this evening and her blood pressure was 105/62 with pulse of 86. Patient's blood glucose was 151 mg/dl. Patient takes oral medications for diabetes which have not been ordered as of yet. Medications are to be reviewed by medical doctor and reordered. Q 15 minute checks done on pt for safety      Problem: Depression/Self Harm  Goal: Effect of psychiatric condition will be minimized and patient will be protected from self harm  Description: INTERVENTIONS:  1. Assess impact of patient's symptoms on level of functioning, self care needs and offer support as indicated  2. Assess patient/family knowledge of depression, impact on illness and need for teaching  3. Provide emotional support, presence and reassurance  4. Assess for possible suicidal thoughts or ideation. If patient expresses suicidal thoughts or statements do not leave alone, initiate Suicide Precautions, move to a room close to the nursing station and obtain sitter  5. Initiate consults as appropriate with Mental Health Professional, Spiritual Care, Psychosocial CNS, and consider a recommendation to the LIP for a Psychiatric Consultation  7/17/2024 1036 by Marbin Bhardwaj LPN  Outcome: Progressing  7/16/2024 2228 by Ramesh Tom, RN  Outcome: Progressing  Note: Patient reports depression. Patient has sad affect. Patient is isolative, withdrawn and not interactive with peers while in dayroom. Patient denies any thoughts to harm self currently. Patient encouraged to attend groups to learn coping skills. Patient stated she had been going to them in the afternoon but felt too tired this evening to attend any more. Patient reports

## 2024-07-17 NOTE — TRANSITION OF CARE
Behavioral Health Transition Record    Patient Name: Brooke Blake  YOB: 1970   Medical Record Number: [id number]  Date of Admission: 7/16/2024  3:55 AM   Date of Discharge: 7/18/2024    Attending Provider: Adalberto Kaplan MD   Discharging Provider: Adalberto Kaplan C, MD  To contact this individual call [phone] and ask the  to page.  If unavailable, ask to be transferred to Behavioral Health Provider on call.  A Behavioral Health Provider will be available on call 24/7 and during holidays.    Primary Care Provider: Albert Julio MD    Allergies   Allergen Reactions    Methylisothiazolinone     Misc Natural Products      Ethyl cynanoacrylate  sesquiterpenelactone mix  methylisothiazolinone  dimethylanunoproplamine  Propylene glycol  diaphenylguanidine    Nickel     Palm Oil     Polyethylene Glycol     Neosporin [Bacitracin-Neomycin-Polymyxin] Rash    Vitamin A Palmitate [Vitamin A] Nausea And Vomiting       Reason for Admission: Pt verbalized SI by carbon monoxide poisoning. Going through a divorce and empty ash. Family wants nothing to do with her.     Admission Diagnosis: Depression with suicidal ideation [F32.A, R45.851]    * No surgery found *    Results for orders placed or performed during the hospital encounter of 07/16/24   Cortisol Total   Result Value Ref Range    Cortisol 12.7 2.5 - 19.5 ug/dL    Cortisol Collection Info AM    C-Reactive Protein   Result Value Ref Range    CRP <3.0 0.0 - 5.0 mg/L   POC Glucose Fingerstick   Result Value Ref Range    POC Glucose 175 (H) 65 - 105 mg/dL   POC Glucose Fingerstick   Result Value Ref Range    POC Glucose 151 (H) 65 - 105 mg/dL       Immunizations administered during this encounter:   There is no immunization history on file for this patient.  Influenza Vaccination Status: None of the above/Not documented/Unable to determine from medical record documentation    Screening for Metabolic Disorders for Patients on  [phone]   ARIPiprazole 10 MG tablet  DULoxetine HCl 40 MG Cpep         Unresulted Labs (24h ago, onward)      None            To obtain results of studies pending at discharge, please contact [phone]    Follow-up Information       Follow up With Specialties Details Why Contact Randy    [facility]  Follow up on 7/29/2024 @1030am with Ruth MEDINA for intake assessment. This is at the [facility] location. [facility] [address]  [phone]               Advanced Directive:   Does the patient have an appointed surrogate decision maker? No  Does the patient have a Medical Advance Directive? No  Does the patient have a Psychiatric Advance Directive? No  If the patient does not have a surrogate or Medical Advance Directive AND Psychiatric Advance Directive, the patient was offered information on these advance directives Patient declined to complete    Patient Instructions: Please continue all medications until otherwise directed by physician.  Please attend all follow up appointments.    Tobacco Cessation Discharge Plan:   Is the patient a tobacco user  and needs referral for tobacco cessation? No  Patient referred to the following for tobacco cessation with an appointment? No  Patient was offered medication to assist with tobacco cessation at discharge? No    Alcohol/Substance Abuse Discharge Plan:   Does the patient have a history of substance/alcohol abuse and requires a referral for treatment? No  Patient referred to the following for substance/alcohol abuse treatment with an appointment? No  Patient was offered medication to assist with substance/alcohol abuse cessation at discharge? No      Patient discharged to: Home; Transition record discussed with patient/caregiver and provided this record in hard copy or electronically

## 2024-07-17 NOTE — GROUP NOTE
Group Therapy Note    Date: 7/17/2024    Group Start Time: 1430  Group End Time: 1515  Group Topic: Psychoeducation    [facility]    Lata Romo CTRS    Group Therapy Note    Attendees: 11/13     Patient's Goal:  Patient will identify benefits of leisure for coping.  Patient will demonstrate improved problem solving skills through leisure activity.    Notes:  Patient attended group and participated.      Status After Intervention:  Improved    Participation Level: Active Listener and Interactive    Participation Quality: Appropriate, Attentive, and Supportive      Speech:  normal      Thought Process/Content: Logical  Linear      Affective Functioning: Constricted/Restricted      Mood: euthymic      Level of consciousness:  Alert, Oriented x4, and Attentive      Response to Learning: Able to verbalize current knowledge/experience, Able to verbalize/acknowledge new learning, Able to retain information, Capable of insight, Able to change behavior, and Progressing to goal      Endings: None Reported    Modes of Intervention: Education, Support, Socialization, and Exploration      Discipline Responsible: Psychoeducational Specialist      Signature:  VENTURA Gross

## 2024-07-17 NOTE — PROGRESS NOTES
Daily Progress Note  7/17/2024    Patient Name: Brooke Blake    CHIEF COMPLAINT: Depression with suicidal ideation and a plan          vitals: WNL  New results: Cortisol 12.7    SUBJECTIVE:    Patient seen face-to-face follow-up assessment.  She is cooperative with discussion.  Thought process linear and goal-directed.  She reports mood is better today.  Feeling more forward-looking and constructive.  She has had more time to think about her medical conditions and states that this current time she is able to function and that she is appreciative of her ability to continue working.  Will try to maintain positive thoughts.  She has been engaging in group programming and is found coping mechanisms such as deep breathing exercise to be very helpful.  She has been utilizing coping skills and notes a reduction in the intensity of her anxiety today.  Suicidal thoughts are less severe.  Starting to feel safer from self on unit.  She has been compliant with her scheduled psychotropic medications which include aripiprazole 10 mg and duloxetine 30 mg.  Has been tolerating these medications well and denies any side effects.  Reports fair sleep overnight.  Staff report that she does attend group programming with good participation.  She has been able to maintain behavioral control and has not required any emergency medications.      Appetite:  [x] Adequate/Unchanged  [] Increased  [] Decreased      Sleep:       [x] Adequate/Unchanged  [] Fair  [] Poor      Group Attendance on Unit:   [x] Yes   [] Selectively    [] No    Compliant with scheduled medications: [x] Yes  [] No    Received emergency medications in past 24 hrs: [] Yes   [x] No    Medication Side Effects: Denies         Mental Status Exam  Level of consciousness: Alert and awake   Appearance: Appropriate attire for setting, seated in chair, with fair  grooming and hygiene   Behavior/Motor: Approachable, engages with interviewer, no psychomotor abnormalities  extended release capsule 40 mg, 40 mg, Oral, Daily  acetaminophen (TYLENOL) tablet 650 mg, 650 mg, Oral, Q4H PRN  ibuprofen (ADVIL;MOTRIN) tablet 400 mg, 400 mg, Oral, Q6H PRN  aluminum & magnesium hydroxide-simethicone (MAALOX) 200-200-20 MG/5ML suspension 30 mL, 30 mL, Oral, Q6H PRN  hydrOXYzine HCl (ATARAX) tablet 50 mg, 50 mg, Oral, TID PRN  bisacodyl (DULCOLAX) EC tablet 5 mg, 5 mg, Oral, Daily PRN  traZODone (DESYREL) tablet 50 mg, 50 mg, Oral, Nightly PRN  fludrocortisone (FLORINEF) tablet 0.1 mg, 0.1 mg, Oral, Daily  ARIPiprazole (ABILIFY) tablet 10 mg, 10 mg, Oral, Daily    ASSESSMENT  Major depressive disorder, recurrent severe without psychotic features (HCC)         PATIENT HANDOFF  Patient symptoms are: modestly improving  Monitor need and frequency of PRN medications.  Encourage participation in groups and milieu.  Medication changes and discharge planning per attending  Follow-up daily while inpatient.     Patient continues to be monitored in the inpatient psychiatric facility at [facility] for safety and stabilization. Patient continues to need, on a daily basis, active treatment furnished directly by or requiring the supervision of inpatient psychiatric personnel.    Electronically signed by NOEL Carey CNP on 7/17/2024 at 4:03 PM    **This report has been created using voice recognition software. It may contain minor errors which are inherent in voice recognition technology.**     I independently saw and evaluated the patient.  I reviewed the nurse practitioners documentation above.  Principle diagnosis we are treating for is Major depressive disorder, recurrent severe without psychotic features (HCC).  Any additional comments or changes to the nurse practitioners documentation are stated below otherwise agree with assessment.  Plan will be as follows:  Spent 17 minutes with the patient in supportive psychotherapy.  Patient denying side effects to medication.  Reporting improvement in mood.

## 2024-07-17 NOTE — PLAN OF CARE
Problem: Chronic Conditions and Co-morbidities  Goal: Patient's chronic conditions and co-morbidity symptoms are monitored and maintained or improved  7/16/2024 2228 by Ramesh Tom, RN  Outcome: Progressing  Note: Patient has been drinking fluids this evening and her blood pressure was 105/62 with pulse of 86. Patient's blood glucose was 151 mg/dl. Patient takes oral medications for diabetes which have not been ordered as of yet. Medications are to be reviewed by medical doctor and reordered. Q 15 minute checks done on pt for safety      Problem: Depression/Self Harm  Goal: Effect of psychiatric condition will be minimized and patient will be protected from self harm  Description: INTERVENTIONS:  1. Assess impact of patient's symptoms on level of functioning, self care needs and offer support as indicated  2. Assess patient/family knowledge of depression, impact on illness and need for teaching  3. Provide emotional support, presence and reassurance  4. Assess for possible suicidal thoughts or ideation. If patient expresses suicidal thoughts or statements do not leave alone, initiate Suicide Precautions, move to a room close to the nursing station and obtain sitter  5. Initiate consults as appropriate with Mental Health Professional, Spiritual Care, Psychosocial CNS, and consider a recommendation to the LIP for a Psychiatric Consultation  7/16/2024 2228 by Ramesh Tom, RN  Outcome: Progressing  Note: Patient reports depression. Patient has sad affect. Patient is isolative, withdrawn and not interactive with peers while in dayroom. Patient denies any thoughts to harm self currently. Patient encouraged to attend groups to learn coping skills. Patient stated she had been going to them in the afternoon but felt too tired this evening to attend any more. Patient reports learning about 4 by 4 breathing and \"hands to hands\" [facility]. Patient encouraged to keep

## 2024-07-17 NOTE — PROGRESS NOTES
[facility] Internal Medicine  Marcus Arroyo MD; Rylan Pradhan MD, Panfilo Khan MD, Salina Orosco MD, Amna Da Silva MD; Donato Marie MD    [facility] Internal Medicine   IN-PATIENT SERVICE   [facility]     HISTORY AND PHYSICAL EXAMINATION            Date:   7/17/2024  Patient name:  Brooke Blake  Date of admission:  7/16/2024  3:55 AM  MRN:   [id number]  Account:  [id number]  YOB: 1970  PCP:    Albert Julio MD  Room:   [address] [place] [address]  Code Status:    Prior      Chief Complaint:     Hypotension      History Obtained From:     Patient/EMR/bedside RN     History of Present Illness:     53-year-old  lady with history of diabetes mellitus last A1c 3 years ago 6.9 on metformin history of hypertension was admitted to [facility] 2 days ago [place] noted to have pancreatitis on CT scan lipase was at 147 decreased to 98 discharge to behavioral health unit where patient was noted to have hypotension systolic 70/30 no significant tachycardia patient has some dizziness no fever chills no nausea vomiting patient has been taking antihypertensive medication and also Flexeril      Past Medical History:     Past Medical History:   Diagnosis Date    Anxiety     Chronic fatigue     Chronic fatigue     Diabetes mellitus type 2, diet-controlled (HCC)     Fibromyalgia     Fibromyalgia     GERD (gastroesophageal reflux disease)     Heavy periods     History of ovarian cyst     HTN (hypertension)         Past Surgical History:     Past Surgical History:   Procedure Laterality Date    BREAST BIOPSY Right 02/14/2017    Sterotactic Rt Breast         Medications Prior to Admission:     Prior to Admission medications    Medication Sig Start Date End Date Taking? Authorizing Provider   ARIPiprazole (ABILIFY) 10 MG tablet Take 1 tablet by mouth daily 7/18/24  Yes Adalberto Kaplan MD   DULoxetine 40 MG CPEP Take 40 mg by mouth  WO CONTRAST    Result Date: 7/13/2024  1. No acute findings in the chest. 2. Hepatic steatosis. RECOMMENDATIONS: Pathology: Multiple pulmonary nodules. Most significant: Right solid pulmonary nodule measuring 3 mm.Per Fleischner Society Guidelines, no routine follow-up imaging is recommended.These guidelines do not apply to immunocompromised patients and patients with cancer. Follow up in patients with significant comorbidities as clinically warranted. For lung cancer screening, adhere to Lung-RADS guidelines. Reference: Radiology. 2017; 284(1):228-43.     CT ABDOMEN PELVIS WO CONTRAST Additional Contrast? None    Result Date: 7/13/2024  1. Minimal stranding around the head of the pancreas of concern for low-grade pancreatitis given elevated lipase level. 2. Hepatic steatosis. 3. Mild right pelvicaliectasis without ureteral calculi. 4. Colonic diverticulosis without acute diverticulitis. 5. Small fat-containing umbilical hernia without strangulation.     XR CHEST PORTABLE    Result Date: 7/13/2024  No radiographic evidence of acute cardiopulmonary pathology.       Assessment :      Hospital Problems             Last Modified POA    * (Principal) Major depressive disorder, recurrent severe without psychotic features (HCC) 7/16/2024 Yes     Plan:     Admitted to inpatient psych  53-year-old  lady with history of diabetes mellitus last A1c 3 years ago 6.9 on metformin history of hypertension was admitted to [facility] 2 days ago [place] noted to have pancreatitis on CT scan lipase was at 147 decreased to 98 discharge to behavioral health unit where patient was noted to have hypotension systolic 70/30 no significant tachycardia patient has some dizziness no fever chills no nausea vomiting patient has been taking antihypertensive medication and also Flexeril  Mild pancreatitis with some dehydration and hypotension secondary to antihypertensive medication no signs of sepsis patient received a liter of

## 2024-07-17 NOTE — PROGRESS NOTES
07/17/24 1629   Encounter Summary   Encounter Overview/[facility]al [facility]   Service Provided For Patient   Last Encounter  07/17/24   Spiritual/Emotional needs   Type Spiritual Support;Spiritual Distress;Emotional Distress   Behavioral Health    Type  [facility]   Assessment/Intervention/Outcome   Intervention Discussed belief system/Mormonism practices/zheng;Discussed relationship with God;Prayer (assurance of)/[place];Read/Provided Scripture;Sustaining Presence/Ministry of presence   Outcome Engaged in conversation;Venting emotion

## 2024-07-17 NOTE — GROUP NOTE
Group Therapy Note    Date: 7/17/2024    Group Start Time: 1115  Group End Time: 1145  Group Topic: Psychoeducation    [facility]    Group Therapy Note    Attendees: 10/15     Patient's Goal:  Patient will demonstrate improved interpersonal skills.  Patient will identify benefits of and ways to improve social support.    Notes:  Patient attended group and participated.     Status After Intervention:  Improved    Participation Level: Active Listener and Interactive    Participation Quality: Appropriate, Attentive, Sharing, and Supportive      Speech:  normal      Thought Process/Content: Logical  Linear      Affective Functioning: Blunted      Mood: euthymic      Level of consciousness:  Alert, Oriented x4, and Attentive      Response to Learning: Able to verbalize current knowledge/experience, Able to verbalize/acknowledge new learning, Able to retain information, Able to change behavior, and Progressing to goal      Endings: None Reported    Modes of Intervention: Education, Support, Socialization, and Exploration      Discipline Responsible: Psychoeducational Specialist      Signature:  VENTURA Gross

## 2024-07-18 VITALS
WEIGHT: 144.63 LBS | RESPIRATION RATE: 14 BRPM | HEIGHT: 67 IN | HEART RATE: 98 BPM | DIASTOLIC BLOOD PRESSURE: 61 MMHG | SYSTOLIC BLOOD PRESSURE: 122 MMHG | BODY MASS INDEX: 22.7 KG/M2 | TEMPERATURE: 97.9 F

## 2024-07-18 PROCEDURE — 99239 HOSP IP/OBS DSCHRG MGMT >30: CPT | Performed by: PSYCHIATRY & NEUROLOGY

## 2024-07-18 PROCEDURE — 6370000000 HC RX 637 (ALT 250 FOR IP): Performed by: PSYCHIATRY & NEUROLOGY

## 2024-07-18 RX ADMIN — ARIPIPRAZOLE 10 MG: 10 TABLET ORAL at 08:38

## 2024-07-18 RX ADMIN — DULOXETINE HYDROCHLORIDE 40 MG: 20 CAPSULE, DELAYED RELEASE ORAL at 08:38

## 2024-07-18 NOTE — GROUP NOTE
Group Therapy Note    Date: 7/17/2024    Group Start Time: 1930  Group End Time: 2000  Group Topic: Topic Group    NELLY Tom, Ramesh Burgess RN        Group Therapy Note    Attendees: 15    Patient's all participated in safety group per unit protocol. Patients were accepting of education concerning what consists of contraband in rooms (food, juices, etc).        Patient's Goal:  follow rules of the unit    Notes:  Safety Group      Signature:  Ramesh Tom RN

## 2024-07-18 NOTE — DISCHARGE INSTRUCTIONS
Information:  Medications:   Medication summary provided   I understand that I should take only the medications on my list.     -why and when I need to take each medicine.     -which side effects to watch for.     -that I should carry my medication information at all times in case of     Emergency situations.    I will take all of my medicines to follow up appointments.     -check with my physician or pharmacist before taking any new    Medication, over the counter product or drink alcohol.    -Ask about food, drug or dietary supplement interactions.    -discard old lists and update records with medication providers.    Notify Physician:  Notify physician if you notice:   Always call 911 if you feel your life is in danger  In case of an emergency call 911 immediately!  If 911 is not available call your local emergency medical system for help    Behavioral Health Follow Up:  Original Referral Source: [facility]  Discharge Diagnosis: Depression with suicidal ideation [F32.A, R45.851]  Recommendations for Level of Care: Take medication as prescribed and keep all scheduled appointments  Patient status at discharge:  In control, denies any suicidal/homicidal ideations  [facility] hospital  was:  [facility]  Aftercare plan faxed: Kaitlynn   -faxed by: staff   -date: 7-18-24   -time: 10:25 am  Prescriptions: available at [facility]    Smoking: Quit Smoking.   Call the [employer]'s smoking quitline at 7-912-85Y-QUIT  Know the signs of a heart attack   If you have any of the following symptoms call 911 immediately, do not wait more    Than five minutes.    1. Pressure, fullness and/ or squeezing in the center of the chest spreading to    The jaw, neck or shoulder.    2. Chest discomfort with light headedness, fainting, sweating, nausea or    Shortness of breath.   3. Upper abdominal pressure or discomfort.   4. Lower chest pain, back pain, unusual fatigue, shortness of breath, nausea   Or dizziness.     General

## 2024-07-18 NOTE — PLAN OF CARE
Problem: Chronic Conditions and Co-morbidities  Goal: Patient's chronic conditions and co-morbidity symptoms are monitored and maintained or improved  Outcome: Progressing     Problem: Depression/Self Harm  Goal: Effect of psychiatric condition will be minimized and patient will be protected from self harm  Description: INTERVENTIONS:  1. Assess impact of patient's symptoms on level of functioning, self care needs and offer support as indicated  2. Assess patient/family knowledge of depression, impact on illness and need for teaching  3. Provide emotional support, presence and reassurance  4. Assess for possible suicidal thoughts or ideation. If patient expresses suicidal thoughts or statements do not leave alone, initiate Suicide Precautions, move to a room close to the nursing station and obtain sitter  5. Initiate consults as appropriate with Mental Health Professional, Spiritual Care, Psychosocial CNS, and consider a recommendation to the [facility]IP for a Psychiatric Consultation  7/18/2024 0835 by Marbin Bhardwaj LPN  Outcome: Progressing     Problem: Self Care Deficit  Goal: Return ADL status to a safe level of function  Description: INTERVENTIONS:  1. Administer medication as ordered  2. Assess ADL deficits and provide assistive devices as needed  3. Obtain PT/OT consults as needed  4. Assist and instruct patient to increase activity and self care as tolerated  Outcome: Progressing     Problem: Risk for Elopement  Goal: Patient will not exit the unit/facility without proper excort  7/18/2024 0835 by Marbin Bhardwaj LPN  Outcome: Progressing     Problem: Safety - Adult  Goal: Free from fall injury  Outcome: Progressing     Problem: Nutrition Deficit:  Goal: Optimize nutritional status  Outcome: Progressing     Problem: Pain  Goal: Verbalizes/displays adequate comfort level or baseline comfort level  Outcome: Progressing   Patient remains on the unit free from self harm.  Patient denies pain, and had been

## 2024-07-18 NOTE — TRANSITION OF CARE
[facility]     Emergency Department     Faculty Attestation        I performed a history and physical examination of the patient and discussed management with the resident. I reviewed the residents note and agree with the documented findings and plan of care. Any areas of disagreement are noted on the chart. I was personally present for the key portions of any procedures. I have documented in the chart those procedures where I was not present during the key portions. I have reviewed the emergency nurses triage note. I agree with the chief complaint, past medical history, past surgical history, allergies, medications, social and family history as documented unless otherwise noted below. For Physician Assistant/ Nurse Practitioner cases/documentation I have personally evaluated this patient and have completed at least one if not all key elements of the E/M (history, physical exam, and MDM). Additional findings are as noted. Vital Signs: BP: (!) 132/95  Heart Rate: (!) 135     Temp: 97.9 °F (36.6 °C) SpO2: 99 %  PCP:  Candace Valle    Pertinent Comments:         Critical Care  None      (Please note that portions of this note were completed with a voice recognition program. Efforts were made to edit the dictations but occasionally words are mis-transcribed.  Whenever words are used in this note in any gender, they shall be construed as though they were used in the gender appropriate to the circumstances; and whenever words are used in this note in the singular or plural form, they shall be construed as though they were used in the form appropriate to the circumstances.)    MD Harrison Dang  Attending Emergency Medicine Physician            Kelvin Pierson MD  11/15/22 3951 Patient is a nonsmoker. Does not require smoking cessation education.

## 2024-07-18 NOTE — GROUP NOTE
Group Therapy Note    Date: 7/18/2024    Group Start Time: 0900  Group End Time: 0926  Group Topic: Community Meeting    [facility] Eric Soriano RN        Group Therapy Note    Attendees: 7/16       Patient's Goal:  \"peace and calm amongst the storms\"    Notes:  coping skills     Status After Intervention:  Improved    Participation Level: Active Listener and Interactive    Participation Quality: Appropriate, Attentive, and Sharing      Speech:  normal      Thought Process/Content: Logical  Linear      Affective Functioning: Congruent      Mood: euthymic      Level of consciousness:  Alert, Oriented x4, and Attentive      Response to Learning: Progressing to goal      Endings: None Reported    Modes of Intervention: Education      Discipline Responsible: Registered Nurse      Signature:  Eric Griffin RN

## 2024-07-18 NOTE — PROGRESS NOTES
CLINICAL PHARMACY NOTE: MEDS TO BEDS    Total # of Prescriptions Filled: 2   The following medications were delivered to the patient:  Aripiprazole 10MG Tablets  Duloxetine HCL 40MG Capsules    Additional Documentation:  Delivered to [facility] and signed for by Chrissy at 11:00AM 7/18/24
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
## 2024-07-18 NOTE — BH NOTE
Per NP, Patient given 1000 mL of 0.9 % sodium chloride due to low BP. Line placed by House Supervisor. BP 60/48 taken manually.   
After receiving 1,500 mL IV bolus, manual blood pressure recheck 104/68. Pt tolerated well. Internal medicine messaged via [place] for any additional orders. IV to be removed if no additional orders.   
[facility] [place]  Discharge Note    Pt discharged with followings belongings:   Dental Appliances: None  Vision - Corrective Lenses: None  Hearing Aid: None  Jewelry: None  Body Piercings Removed: N/A  Clothing: Undergarments, Slippers, Shirt  Other Valuables: Purse, Wallet, Credit/Debit Card, Personal Toiletries   Valuables sent home with patient or returned to patient. Patient educated on aftercare instructions: YES  [facility]  at 9:12 AM .Patient verbalize understanding of AVS:  YES.    Status EXAM upon discharge:  Mental Status and Behavioral Exam  Normal: Yes  Level of Assistance: Independent/Self  Facial Expression: Brightened  Affect: Appropriate  Level of Consciousness: Alert  Frequency of Checks: 4 times per hour, close  Mood:Normal: Yes  Mood: Other (comment)  Motor Activity:Normal: Yes  Motor Activity: Increased  Eye Contact: Good  Observed Behavior: Cooperative, Friendly  Sexual Misconduct History: Current - no  Preception: [place] to person, [place] to time, [place] to place, [place] to situation  Attention:Normal: Yes  Attention: Unable to concentrate  Thought Processes: Unremarkable  Thought Content:Normal: Yes  Thought Content: Preoccupations  Depression Symptoms: No problems reported or observed.  Anxiety Symptoms: No problems reported or observed.  Jailyn Symptoms: No problems reported or observed.  Hallucinations: None  Delusions: No  Memory:Normal: Yes  Insight and Judgment: No  Insight and Judgment: Poor judgment, Poor insight    Tobacco Screening:  Practical Counseling, on admission, red X, if applicable and completed (first 3 are required if patient doesn't refuse) :            ( x) Recognizing danger situations (included triggers and roadblocks)                    ( x) Coping skills (new ways to manage stress,relaxation techniques, changing routine, distraction)                                                           ( ) Basic information about quitting 
Blood Pressure was rechecked and was 98/54, NP notified and ordered and additional 500 ml bolus of 0.9% sodium chloride was given.   
HENRIK:     ID: [id number]   
Orthostatic blood pressure to be obtained per internal medicine. Fall precautions to be in place. Cortisol level stat. Another 1,000 mL IV bolus initiated.   
Orthostatic blood pressures taken and charted. Provider notified. No new orders at this time.   
Patient denies ever engaging in tobacco smoking.  
Patient tolerated removal of IV from right forearm. IV catheter was intact upon removal. No complications occurred and no side effects noted.   
Coping skills (new ways to manage stress,relaxation techniques, changing routine, distraction)                                                           ( ) Basic information about quitting (benefits of quitting, techniques in how to quit, available resources  ( ) [facility]                                                                                                                   ( ) Patient refused counseling  ( x) Patient has not smoked in the last 30 days    Metabolic Screening:    Lab Results   Component Value Date    LABA1C 6.9 08/02/2021       Lab Results   Component Value Date    CHOL 168 09/14/2015    CHOL 191 08/29/2013     Lab Results   Component Value Date    TRIG 88 09/14/2015    TRIG 71 08/29/2013     Lab Results   Component Value Date    HDL 92 02/19/2024    HDL 69 09/14/2015    HDL 92 08/29/2013     No components found for: \"LDLCAL\"  No components found for: \"LABVLDL\"      Body mass index is 22.65 kg/m².    BP Readings from Last 2 Encounters:   07/16/24 (!) 60/48   07/15/24 (!) 152/78           Pt admitted with followings belongings:  Dental Appliances: None  Vision - Corrective Lenses: None  Hearing Aid: None  Jewelry: None  Body Piercings Removed: N/A  Clothing: Undergarments, Slippers, Shirt  Other Valuables: Purse, Wallet, Credit/Debit Card, Personal Toiletries  The following personal items were collected during admission. Items secured in locker/safe. Items will be returned to patient at discharge.     Fallon Finnegan RN

## 2024-07-18 NOTE — GROUP NOTE
Group Therapy Note    Date: 7/18/2024    Group Start Time: 1005  Group End Time: 1045  Group Topic: Psychoeducation    [facility] LSW        Group Therapy Note    Attendees: 6/16       Patient's Goal:  managing automatic thoughts     Notes:      Status After Intervention:  Improved    Participation Level: Active Listener and Interactive    Participation Quality: Appropriate      Speech:  normal      Thought Process/Content: Logical      Affective Functioning: Congruent      Mood: euthymic      Level of consciousness:  Alert and Oriented x4      Response to Learning: Progressing to goal      Endings: None Reported    Modes of Intervention: Education and Support      Discipline Responsible: /Counselor      Signature:  VANESA Lynn

## 2024-07-18 NOTE — PLAN OF CARE
Problem: Depression/Self Harm  Goal: Effect of psychiatric condition will be minimized and patient will be protected from self harm  Description: INTERVENTIONS:  1. Assess impact of patient's symptoms on level of functioning, self care needs and offer support as indicated  2. Assess patient/family knowledge of depression, impact on illness and need for teaching  3. Provide emotional support, presence and reassurance  4. Assess for possible suicidal thoughts or ideation. If patient expresses suicidal thoughts or statements do not leave alone, initiate Suicide Precautions, move to a room close to the nursing station and obtain sitter  5. Initiate consults as appropriate with Mental Health Professional, Spiritual Care, Psychosocial CNS, and consider a recommendation to the [facility]IP for a Psychiatric Consultation  7/17/2024 2038 by Pamela Conrad LPN  Note: Patient denies suicidal ideations at this time. Patient agrees to seek out staff if they begin having suicidal ideations or need to talk. Q15min safety checks continue     Problem: Risk for Elopement  Goal: Patient will not exit the unit/facility without proper excort  7/17/2024 2038 by Pamela Conrad LPN  Note: Patient has made no attempts to elope this evening, reports readiness for discharge for tomorrow, has been calm and social in dayroom. Q 15 min safety checks continue.

## 2024-08-06 ENCOUNTER — HOSPITAL ENCOUNTER (OUTPATIENT)
Age: 54
Setting detail: SPECIMEN
Discharge: HOME | End: 2024-08-06

## 2024-08-06 LAB
BASOPHILS NFR BLD: 1 % (ref 0–2)
EOSINOPHIL # BLD: 0.21 K/UL (ref 0–0.44)
EOSINOPHILS RELATIVE PERCENT: 4 % (ref 1–4)
ERYTHROCYTE [DISTWIDTH] IN BLOOD BY AUTOMATED COUNT: 13.8 % (ref 11.8–14.4)
HCT VFR BLD AUTO: 41.1 % (ref 36.3–47.1)
HGB BLD-MCNC: 13.3 G/DL (ref 11.9–15.1)
IMM GRANULOCYTES # BLD AUTO: <0.03 K/UL (ref 0–0.3)
IMM GRANULOCYTES NFR BLD: 0 %
LYMPHOCYTES NFR BLD: 1.6 K/UL (ref 1.1–3.7)
LYMPHOCYTES RELATIVE PERCENT: 32 % (ref 24–43)
MCH RBC QN AUTO: 33.3 PG (ref 25.2–33.5)
MCHC RBC AUTO-ENTMCNC: 32.4 G/DL (ref 28.4–34.8)
MCV RBC AUTO: 102.8 FL (ref 82.6–102.9)
MONOCYTES NFR BLD: 0.43 K/UL (ref 0.1–1.2)
MONOCYTES NFR BLD: 9 % (ref 3–12)
NEUTROPHILS NFR BLD: 54 % (ref 36–65)
NEUTS SEG NFR BLD: 2.76 K/UL (ref 1.5–8.1)
NRBC BLD-RTO: 0 PER 100 WBC
PLATELET # BLD AUTO: 353 K/UL (ref 138–453)
PMV BLD AUTO: 9.6 FL (ref 8.1–13.5)
RBC # BLD AUTO: 4 M/UL (ref 3.95–5.11)
WBC OTHER # BLD: 5.1 K/UL (ref 3.5–11.3)